# Patient Record
Sex: FEMALE | Race: WHITE | NOT HISPANIC OR LATINO | Employment: FULL TIME | URBAN - METROPOLITAN AREA
[De-identification: names, ages, dates, MRNs, and addresses within clinical notes are randomized per-mention and may not be internally consistent; named-entity substitution may affect disease eponyms.]

---

## 2017-01-02 DIAGNOSIS — O24.410 DIET CONTROLLED GESTATIONAL DIABETES MELLITUS (GDM), ANTEPARTUM: Primary | ICD-10-CM

## 2017-01-02 NOTE — TELEPHONE ENCOUNTER
Accu Check Test Strips AND Softclix Lancets      Last Written Prescription Date:  NA  Last Fill Quantity: NA,   # refills: NA  Last Office Visit with [facility], [facility] or [facility] prescribing provider: 5/20/16  Future Office visit:    Next 5 appointments (look out 90 days)     Jan 05, 2017  4:15 PM   ESTABLISHED PRENATAL with Bobby Styles MD   [facility] ([facility])    [address] [address] [phone]                   Routing refill request to provider for review/approval because:  Drug not active on patient's medication list

## 2017-01-03 ENCOUNTER — TELEPHONE (OUTPATIENT)
Dept: OBGYN | Facility: CLINIC | Age: 32
End: 2017-01-03

## 2017-01-05 ENCOUNTER — PRENATAL OFFICE VISIT (OUTPATIENT)
Dept: OBGYN | Facility: CLINIC | Age: 32
End: 2017-01-05
Payer: COMMERCIAL

## 2017-01-05 VITALS
BODY MASS INDEX: 26 KG/M2 | SYSTOLIC BLOOD PRESSURE: 104 MMHG | WEIGHT: 161 LBS | HEART RATE: 76 BPM | DIASTOLIC BLOOD PRESSURE: 62 MMHG | OXYGEN SATURATION: 99 %

## 2017-01-05 DIAGNOSIS — Z84.89 FAMILY HISTORY OF GENETIC DISORDER: Primary | ICD-10-CM

## 2017-01-05 DIAGNOSIS — O24.419 GESTATIONAL DIABETES MELLITUS (GDM), ANTEPARTUM, GESTATIONAL DIABETES METHOD OF CONTROL UNSPECIFIED: ICD-10-CM

## 2017-01-05 DIAGNOSIS — Z34.80 ENCOUNTER FOR SUPERVISION OF OTHER NORMAL PREGNANCY, UNSPECIFIED TRIMESTER: ICD-10-CM

## 2017-01-05 PROCEDURE — 87653 STREP B DNA AMP PROBE: CPT | Mod: 90 | Performed by: OBSTETRICS & GYNECOLOGY

## 2017-01-05 PROCEDURE — 99207 ZZC PRENATAL VISIT: CPT | Performed by: OBSTETRICS & GYNECOLOGY

## 2017-01-05 PROCEDURE — 99000 SPECIMEN HANDLING OFFICE-LAB: CPT | Performed by: OBSTETRICS & GYNECOLOGY

## 2017-01-05 RX ORDER — LANCETS 28 GAUGE
EACH MISCELLANEOUS
Qty: 1 BOX | Refills: 11 | Status: SHIPPED | OUTPATIENT
Start: 2017-01-05 | End: 2017-04-21

## 2017-01-05 NOTE — NURSING NOTE
"Chief Complaint   Patient presents with     Prenatal Care     36w1d  Group B due  Refill testing supplies for diabetes- has had a cold and BS have been running higher  Initial /62 mmHg  Pulse 76  Wt 161 lb (73.029 kg)  SpO2 99%  LMP 05/06/2016 Estimated body mass index is 26 kg/(m^2) as calculated from the following:    Height as of 5/31/16: 5' 6\" (1.676 m).    Weight as of this encounter: 161 lb (73.029 kg).  BP completed using cuff size: regular  Leslie Levi MA        "

## 2017-01-07 LAB
GP B STREP DNA SPEC QL NAA+PROBE: NORMAL
SPECIMEN SOURCE: NORMAL

## 2017-01-11 ENCOUNTER — ALLIED HEALTH/NURSE VISIT (OUTPATIENT)
Dept: EDUCATION SERVICES | Facility: CLINIC | Age: 32
End: 2017-01-11
Payer: COMMERCIAL

## 2017-01-11 DIAGNOSIS — O24.410 DIET CONTROLLED GESTATIONAL DIABETES MELLITUS (GDM), ANTEPARTUM: Primary | ICD-10-CM

## 2017-01-11 PROCEDURE — G0108 DIAB MANAGE TRN  PER INDIV: HCPCS

## 2017-01-11 NOTE — PROGRESS NOTES
Diabetes Self-Management Training - Gestational Diabetes    SUBJECTIVE/OBJECTIVE:  Sarahi Gates presents today for education related to gestational diabetes.  She is accompanied by self    Patient's gestational diabetes management related comments/concerns: none, feels she has a handle on GDM    Patient's emotional response to diabetes: expresses readiness to learn and confidence diabetes can be controlled    LMP 2016    Pre pregnancy weight: 148#    Weight gain 13 lbs at 37 weeks gestation.    Estimated Date of Delivery: 2017    GLC       94   2014    1 hour OGTT: 169 on 11/15/16  3 hour OGTT: Fastin; 1 hr: 225; 2 hr: 210, 3 hr: 71 on 16    Blood Glucose/Ketone Log:    Date Ketones Fasting Post Breakfast Post Lunch Post Supper   1/3 na 103           130     87   113     86 95 126 118     89 103 91 107     89 130 114 107     91 95 114 114   1/10  95  120      98 118           History   Smoking status     Never Smoker    Smokeless tobacco     Never Used       Lifestyle and Health Behaviors:  Physical Activity: no regular exercise program    Nutrition:  Patient eats 3 meals and 3 snacks per day.    Breakfast:  Whole wheat toast with PB and banana OR protein bar with fruit  Snack:  Protein bar OR greek yogurt with granola OR berries and nuts  Lunch:  Salad OR leftovers from dinner (white chicken chili soup OR pasta)  Snack:  Popcorn OR pistachios with berries  Dinner:  Fish with potatoes and green beans OR smoothie (not feeling well)  Bedtime Snack:  Popcorn OR handful of nuts    Other time(s) food is eaten? No, rarely may wake up in middle of the night     Beverages: water, coffee (black)    Cultural/Jewish diet restrictions: No     Biggest challenge to healthy eating is:  none    Pre-[place] Vitamin: Yes    Supplements: No   Experiencing nausea?  No , but heartburn    Socio/Economic considerations:  Support System: family and spouse/significant other    Health  Beliefs and Attitudes:   Stage of Change: ACTION (Actively working towards change)    ASSESSMENT:  Patient was diagnosed over a month ago, but read things online, and has a friend with diabetes as well that helped her start eating a little less carbohydrate. She tries to eat cleanly, and has been checking her blood sugar even before seeing diabetes education. She is likely going to be induced in a week, though was willing to come in today to learn a little more about post-partum care.     INTERVENTION:    Educational topics covered today:  GDM diagnosis, pathophysiology, Risks and Complications of GDM, When to Call a Diabetes Educator or OB Provider, Healthy Eating During Pregnancy, Counting Carbohydrates, Meal Planning for GDM, and Physical Activity, and what to do after delivery    Educational materials provided today:   [place] Understanding Gestational Diabetes  [place] [facility]  Care After Delivery    Pt verbalized understanding of concepts discussed and recommendations provided today.     PLAN:  Check glucose 4 times daily, before breakfast and 1 hour after each meal.     Check Ketones daily for one week, if negative, reduce testing to once a week.     Physical activity recommended: as able.    Meal plan: 2-3 carbs at breakfast, 3-4 carbs at lunch, 3-4 carbs at supper, 1-2 carbs at 3 snacks a day.  Follow consistent CHO meal plan, eat CHO and protein/fat at all meals/snacks.    Call/e-mail/MyChart message diabetes educator if 3 or more blood sugars are above the goal in 1 week or if ketones are positive.     Call/e-mail/MyChart message with questions/concerns.    FOLLOW-UP:  Call or e-mail educator if 3 or more blood sugars are above goal in 1 week.  Call or e-mail with questions or concerns.    ISAURO Solis CDE  Time Spent: 30 minutes  Encounter type: Individual

## 2017-01-12 ENCOUNTER — PRENATAL OFFICE VISIT (OUTPATIENT)
Dept: OBGYN | Facility: CLINIC | Age: 32
End: 2017-01-12
Payer: COMMERCIAL

## 2017-01-12 VITALS
DIASTOLIC BLOOD PRESSURE: 64 MMHG | SYSTOLIC BLOOD PRESSURE: 108 MMHG | HEART RATE: 69 BPM | WEIGHT: 157 LBS | BODY MASS INDEX: 25.35 KG/M2 | TEMPERATURE: 98 F | OXYGEN SATURATION: 99 %

## 2017-01-12 DIAGNOSIS — Z84.89 FAMILY HISTORY OF GENETIC DISORDER: Primary | ICD-10-CM

## 2017-01-12 DIAGNOSIS — Z34.83 PRENATAL CARE, SUBSEQUENT PREGNANCY, THIRD TRIMESTER: ICD-10-CM

## 2017-01-12 PROCEDURE — 99207 ZZC PRENATAL VISIT: CPT | Performed by: OBSTETRICS & GYNECOLOGY

## 2017-01-12 NOTE — MR AVS SNAPSHOT
After Visit Summary   1/12/2017    Sarahi Gates    MRN: [id number]           Patient Information     Date Of Birth          1985        Visit Information        Provider Department      1/12/2017 4:30 PM Bobby Styles MD [facility]        Today's Diagnoses     Family history of genetic disorder    -  1     Prenatal care, subsequent pregnancy, third trimester            Follow-ups after your visit        Who to contact     If you have questions or need follow up information about today's clinic visit or your schedule please contact [facility] directly at [phone].  Normal or non-critical lab and imaging results will be communicated to you by MyChart, letter or phone within 4 business days after the clinic has received the results. If you do not hear from us within 7 days, please contact the clinic through [employer] or phone. If you have a critical or abnormal lab result, we will notify you by phone as soon as possible.  Submit refill requests through [employer] or call your pharmacy and they will forward the refill request to us. Please allow 3 business days for your refill to be completed.          Additional Information About Your Visit        MyChart Information     [employer] gives you secure access to your electronic health record. If you see a primary care provider, you can also send messages to your care team and make appointments. If you have questions, please call your primary care clinic.  If you do not have a primary care provider, please call [phone] and they will assist you.        Care EveryWhere ID     This is your Care EveryWhere ID. This could be used by other organizations to access your [facility] medical records  [id number]        Your Vitals Were     Pulse Temperature Pulse Oximetry Last Period          69 98  F (36.7  C) (Oral) 99% 05/06/2016         Blood Pressure from Last 3 Encounters:   01/12/17 108/64   01/05/17  104/62   12/15/16 98/58    Weight from Last 3 Encounters:   01/12/17 157 lb (71.215 kg)   01/05/17 161 lb (73.029 kg)   12/15/16 155 lb 12.8 oz (70.67 kg)              Today, you had the following     No orders found for display       Primary Care Provider Office Phone # Fax #    Lydia Iliana Gomez [phone]       [facility] [address]  [facility] [address]        Thank you!     Thank you for choosing [facility]  for your care. Our goal is always to provide you with excellent care. Hearing back from our patients is one way we can continue to improve our services. Please take a few minutes to complete the written survey that you may receive in the mail after your visit with us. Thank you!             Your Updated Medication List - Protect others around you: Learn how to safely use, store and throw away your medicines at www.disposemymeds.org.          This list is accurate as of: 1/12/17  4:40 PM.  Always use your most recent med list.                   Brand Name Dispense Instructions for use    albuterol 108 (90 BASE) MCG/ACT Inhaler    albuterol    3 Inhaler    Inhale 1-2 puffs into the lungs every 4 hours as needed for shortness of breath / dyspnea       blood glucose monitoring lancets     1 Box    Use to test blood sugars 4 times daily or as directed.       blood glucose monitoring meter device kit    no brand specified    1 kit    Use to test blood sugar 4 times daily or as directed.       * blood glucose monitoring test strip    FREESTYLE LITE    100 strip    Use to test blood sugars 4 times daily or as directed.       * blood glucose monitoring test strip    ACCU-CHEK ISH    100 strip    Use to test blood sugars 4 times daily or as directed.       cetirizine 10 MG tablet    zyrTEC    30 tablet    Take 1 tablet by mouth every evening.       fluticasone 50 MCG/ACT spray    FLONASE     Spray 1 spray into both nostrils daily as needed        omeprazole 40 MG capsule    priLOSEC    90 capsule    Take 1 capsule (40 mg) by mouth daily Take 30-60 minutes before a meal.       PRENATAL VITAMIN PO          sertraline 50 MG tablet    ZOLOFT    30 tablet    Take 1 tablet (50 mg) by mouth daily       * Notice:  This list has 2 medication(s) that are the same as other medications prescribed for you. Read the directions carefully, and ask your doctor or other care provider to review them with you.

## 2017-01-12 NOTE — NURSING NOTE
"Chief Complaint   Patient presents with     Prenatal Care       Initial /64 mmHg  Pulse 69  Temp(Src) 98  F (36.7  C) (Oral)  Wt 157 lb (71.215 kg)  SpO2 99%  LMP 2016 Estimated body mass index is 25.35 kg/(m^2) as calculated from the following:    Height as of 16: 5' 6\" (1.676 m).    Weight as of this encounter: 157 lb (71.215 kg).  BP completed using cuff size: regular        The following HM Due: NONE      The following patient reported/Care Every where data was sent to:  P ABSTRACT QUALITY INITIATIVES [76601]       Having lower back pain and some cramping    States feeling good, No concerns.  Having good fetal movements.   37w1d    Rosibel Soto, [facility]                  "

## 2017-01-16 ENCOUNTER — RADIANT APPOINTMENT (OUTPATIENT)
Dept: ULTRASOUND IMAGING | Facility: CLINIC | Age: 32
End: 2017-01-16
Attending: OBSTETRICS & GYNECOLOGY
Payer: COMMERCIAL

## 2017-01-16 DIAGNOSIS — Z34.83 PRENATAL CARE, SUBSEQUENT PREGNANCY, THIRD TRIMESTER: ICD-10-CM

## 2017-01-16 PROCEDURE — 76816 OB US FOLLOW-UP PER FETUS: CPT | Performed by: OBSTETRICS & GYNECOLOGY

## 2017-01-18 ENCOUNTER — PRENATAL OFFICE VISIT (OUTPATIENT)
Dept: OBGYN | Facility: CLINIC | Age: 32
End: 2017-01-18
Payer: COMMERCIAL

## 2017-01-18 VITALS — WEIGHT: 160 LBS | SYSTOLIC BLOOD PRESSURE: 112 MMHG | BODY MASS INDEX: 25.84 KG/M2 | DIASTOLIC BLOOD PRESSURE: 68 MMHG

## 2017-01-18 DIAGNOSIS — Z84.89 FAMILY HISTORY OF GENETIC DISORDER: Primary | ICD-10-CM

## 2017-01-18 DIAGNOSIS — Z34.83 PRENATAL CARE, SUBSEQUENT PREGNANCY, THIRD TRIMESTER: ICD-10-CM

## 2017-01-18 PROCEDURE — 99207 ZZC PRENATAL VISIT: CPT | Performed by: OBSTETRICS & GYNECOLOGY

## 2017-01-18 NOTE — NURSING NOTE
"Chief Complaint   Patient presents with     Prenatal Care   38w0d  Needs refill on     Initial /68 mmHg  Wt 160 lb (72.576 kg)  LMP 05/06/2016 Estimated body mass index is 25.84 kg/(m^2) as calculated from the following:    Height as of 5/31/16: 5' 6\" (1.676 m).    Weight as of this encounter: 160 lb (72.576 kg).  BP completed using cuff size: regular    "

## 2017-01-20 ENCOUNTER — HOSPITAL ENCOUNTER (OUTPATIENT)
Facility: CLINIC | Age: 32
Discharge: HOME | End: 2017-01-20
Attending: OBSTETRICS & GYNECOLOGY | Admitting: OBSTETRICS & GYNECOLOGY
Payer: COMMERCIAL

## 2017-01-20 ENCOUNTER — TELEPHONE (OUTPATIENT)
Dept: NURSING | Facility: CLINIC | Age: 32
End: 2017-01-20

## 2017-01-20 VITALS
SYSTOLIC BLOOD PRESSURE: 109 MMHG | BODY MASS INDEX: 25.71 KG/M2 | RESPIRATION RATE: 18 BRPM | TEMPERATURE: 98 F | WEIGHT: 160 LBS | DIASTOLIC BLOOD PRESSURE: 78 MMHG | HEIGHT: 66 IN

## 2017-01-20 PROBLEM — Z36.89 ENCOUNTER FOR TRIAGE IN PREGNANT PATIENT: Status: ACTIVE | Noted: 2017-01-20

## 2017-01-20 LAB
A1 MICROGLOB PLACENTAL VAG QL: NEGATIVE
GLUCOSE BLDC GLUCOMTR-MCNC: 75 MG/DL (ref 70–99)

## 2017-01-20 PROCEDURE — 84112 EVAL AMNIOTIC FLUID PROTEIN: CPT | Performed by: OBSTETRICS & GYNECOLOGY

## 2017-01-20 PROCEDURE — 99214 OFFICE O/P EST MOD 30 MIN: CPT

## 2017-01-20 PROCEDURE — 82962 GLUCOSE BLOOD TEST: CPT

## 2017-01-20 NOTE — IP AVS SNAPSHOT
[facility] Labor and Delivery    [address]    [facility] [address]    Phone:  [phone]    Fax:  [phone]                                       After Visit Summary   1/20/2017    Sarahi Gates    MRN: [id number]           After Visit Summary Signature Page     I have received my discharge instructions, and my questions have been answered. I have discussed any challenges I see with this plan with the nurse or doctor.    ..........................................................................................................................................  Patient/Patient Representative Signature      ..........................................................................................................................................  Patient Representative Print Name and Relationship to Patient    ..................................................               ................................................  Date                                            Time    ..........................................................................................................................................  Reviewed by Signature/Title    ...................................................              ..............................................  Date                                                            Time

## 2017-01-20 NOTE — TELEPHONE ENCOUNTER
"Call Type: Triage Call    Presenting Problem: \"I may have a leak.\" Patient reporting clear  vaginal discharge starting 1/19/17. Irregular contractions. Fetal  movement +. ARY 2/1/17. Paged Dr Taylor through [facility] [place] at 644 a.m. to call patient at .  Triage Note:  Guideline Title: Pregnancy: Ruptured Membranes  Recommended Disposition: Call Provider Immediately  Original Inclination: Did not know what to do  Override Disposition:  Intended Action: Follow advice given  Physician Contacted: Yes  Sudden gush or trickle of fluid from the vagina ?  YES  New or worsening signs and symptoms that may indicate shock ? NO  More than 37 weeks gestation AND contractions ? NO  Umbilical cord or any part of the baby (head, bottom, arm or leg) at the opening  of the vagina ? NO  Gush or leakage of green or green-tinged or port-wine colored fluid (reddish and  watery) from the vagina ? NO  Decreased fetal movement (less than 10 kicks/movements within two hours or a  significant change in usual pattern compared to previous days) ? NO  Heavy vaginal bleeding (soaking 1 pad every hour for 2 hours or more) ? NO  Continuous bright red vaginal bleeding for more than 15 minutes (more than  spotting) ? NO  37 weeks gestation or less AND contractions ? NO  Physician Instructions:  Care Advice: Do not eat or drink anything until discussed with provider.  Call  if any of these occur: profuse bright red vaginal bleeding  continuous (without relaxation) abdominal pain  the umbilical cord or any fetal part in vagina  bag of james coming through vagina  feeling of wanting to push or have a bowel movement.  "

## 2017-01-20 NOTE — IP AVS SNAPSHOT
MRN:[id number]                      After Visit Summary   1/20/2017    Sarahi Gates    MRN: [id number]           Thank you!     Thank you for choosing [facility] for your care. Our goal is always to provide you with excellent care. Hearing back from our patients is one way we can continue to improve our services. Please take a few minutes to complete the written survey that you may receive in the mail after you visit. If you would like to speak to someone directly about your visit please contact Patient Relations at [phone]. Thank you!          Patient Information     Date Of Birth          1985        About your hospital stay     You were admitted on:  January 20, 2017 You last received care in the:  [facility] Labor and Delivery    You were discharged on:  January 20, 2017       Who to Call     For medical emergencies, please call 911.  For non-urgent questions about your medical care, please call your primary care provider or clinic, [phone]          Attending Provider     Provider    Jackson Taylor MD       Primary Care Provider Office Phone # Fax #    Bobby Styles [phone]       [facility] [address]  [facility] [address]        Your next 10 appointments already scheduled     Jan 25, 2017  1:45 PM   ESTABLISHED PRENATAL with Bobby Styles MD   [facility] ([facility]    [address]  [facility] [id number] [phone]              Further instructions from your care team       Discharge Instruction for Undelivered Patients      You were seen for: Membrane Assessment  We Consulted: DR TAYLOR  You had (Test or Medicine): AMNISURE NEGATIVE & FETAL MONITORING WITHIN NORMAL LIMITS    Diet:   To manager your diabetes, follow the guidelines for eating and drinking given to you by your Clinic Provider or Diabetes Educator.       Activity:  Call your  "doctor or nurse midwife if your baby is moving less than usual.     Call your provider if you notice:  Swelling in your face or increased swelling in your hands or legs.  Headaches that are not relieved by Tylenol (acetaminophen).  Changes in your vision (blurring: seeing spots or stars.)  Nausea (sick to your stomach) and vomiting (throwing up).   Weight gain of 5 pounds or more per week.  Heartburn that doesn't go away.  Signs of bladder infection: pain when you urinate (use the toilet), need to go more often and more urgently.  The bag of james (rupture of membranes) breaks, or you notice leaking in your underwear.  Bright red blood in your underwear.  Abdominal (lower belly) or stomach pain.  For first baby: Contractions (tightening) less than 5 minutes apart for one hour or more.  Second (plus) baby: Contractions (tightening) less than 10 minutes apart and getting stronger.  *If less than 34 weeks: Contractions (tightenings) more than 6 times in one hour.  Increase or change in vaginal discharge (note the color and amount)  Other:     Follow-up:  As scheduled in the clinic          Pending Results     No orders found from 1/19/2017 to 1/21/2017.            Admission Information        Provider Department Dept Phone    1/20/2017 Jackson Taylor MD  Labor And Delivery [phone]      Your Vitals Were     Temperature Height Weight BMI (Body Mass Index) Last Period       98  F (36.7  C) (Oral) 1.676 m (5' 6\") 72.576 kg (160 lb) 25.84 kg/m2 05/06/2016       [employer] Information     [employer] gives you secure access to your electronic health record. If you see a primary care provider, you can also send messages to your care team and make appointments. If you have questions, please call your primary care clinic.  If you do not have a primary care provider, please call [phone] and they will assist you.        Care EveryWhere ID     This is your Care EveryWhere ID. This could be used by other organizations to " access your [facility] medical records  [id number]           Review of your medicines      UNREVIEWED medicines. Ask your doctor about these medicines        Dose / Directions    albuterol 108 (90 BASE) MCG/ACT Inhaler   Commonly known as:  albuterol   Used for:  Moderate persistent asthma        Dose:  1-2 puff   Inhale 1-2 puffs into the lungs every 4 hours as needed for shortness of breath / dyspnea   Quantity:  3 Inhaler   Refills:  3       cetirizine 10 MG tablet   Commonly known as:  zyrTEC   Used for:  Supervision of normal first pregnancy        Dose:  10 mg   Take 1 tablet by mouth every evening.   Quantity:  30 tablet   Refills:  1       fluticasone 50 MCG/ACT spray   Commonly known as:  FLONASE        Dose:  1 spray   Spray 1 spray into both nostrils daily as needed   Refills:  0       omeprazole 40 MG capsule   Commonly known as:  priLOSEC   Used for:  Gastroesophageal reflux disease without esophagitis        Dose:  40 mg   Take 1 capsule (40 mg) by mouth daily Take 30-60 minutes before a meal.   Quantity:  90 capsule   Refills:  3       PRENATAL VITAMIN PO        Refills:  0       sertraline 50 MG tablet   Commonly known as:  ZOLOFT   Used for:  Anxiety        Dose:  50 mg   Take 1 tablet (50 mg) by mouth daily   Quantity:  30 tablet   Refills:  3         CONTINUE these medicines which have NOT CHANGED        Dose / Directions    blood glucose monitoring lancets   Used for:  Gestational diabetes mellitus (GDM), antepartum, gestational diabetes method of control unspecified        Use to test blood sugars 4 times daily or as directed.   Quantity:  1 Box   Refills:  11       blood glucose monitoring meter device kit   Commonly known as:  no brand specified   Used for:  Diet controlled gestational diabetes mellitus (GDM), antepartum        Use to test blood sugar 4 times daily or as directed.   Quantity:  1 kit   Refills:  0       * blood glucose monitoring test strip   Commonly known as:  FREESTYLE LITE    Used for:  Gestational diabetes mellitus (GDM), antepartum, gestational diabetes method of control unspecified        Use to test blood sugars 4 times daily or as directed.   Quantity:  100 strip   Refills:  11       * blood glucose monitoring test strip   Commonly known as:  ACCU-CHEK ISH   Used for:  Diet controlled gestational diabetes mellitus (GDM), antepartum        Use to test blood sugars 4 times daily or as directed.   Quantity:  100 strip   Refills:  11       * Notice:  This list has 2 medication(s) that are the same as other medications prescribed for you. Read the directions carefully, and ask your doctor or other care provider to review them with you.             Protect others around you: Learn how to safely use, store and throw away your medicines at www.[employer]eds.org.             Medication List: This is a list of all your medications and when to take them. Check marks below indicate your daily home schedule. Keep this list as a reference.      Medications           Morning Afternoon Evening Bedtime As Needed    albuterol 108 (90 BASE) MCG/ACT Inhaler   Commonly known as:  albuterol   Inhale 1-2 puffs into the lungs every 4 hours as needed for shortness of breath / dyspnea                                blood glucose monitoring lancets   Use to test blood sugars 4 times daily or as directed.                                blood glucose monitoring meter device kit   Commonly known as:  no brand specified   Use to test blood sugar 4 times daily or as directed.                                * blood glucose monitoring test strip   Commonly known as:  FREESTYLE LITE   Use to test blood sugars 4 times daily or as directed.                                * blood glucose monitoring test strip   Commonly known as:  ACCU-CHEK ISH   Use to test blood sugars 4 times daily or as directed.                                cetirizine 10 MG tablet   Commonly known as:  zyrTEC   Take 1 tablet by mouth every  evening.                                fluticasone 50 MCG/ACT spray   Commonly known as:  FLONASE   Spray 1 spray into both nostrils daily as needed                                omeprazole 40 MG capsule   Commonly known as:  priLOSEC   Take 1 capsule (40 mg) by mouth daily Take 30-60 minutes before a meal.                                PRENATAL VITAMIN PO                                sertraline 50 MG tablet   Commonly known as:  ZOLOFT   Take 1 tablet (50 mg) by mouth daily                                * Notice:  This list has 2 medication(s) that are the same as other medications prescribed for you. Read the directions carefully, and ask your doctor or other care provider to review them with you.

## 2017-01-20 NOTE — PLAN OF CARE
Problem: Goal Outcome Summary  Goal: Goal Outcome Summary  Data: [facility] 7[facility].   Reason for maternal/fetal assessment per patient is Rule out rupture of membranes  . Patient is a . Prenatal record reviewed.      Obstetric History       T1      TAB0   SAB0   E0   M0   L1        # Outcome Date GA Lbr Jose Antonio/2nd Weight Sex Delivery Anes PTL Lv   2 Current                     1 Term 13 38w4d 07:50 / 02:23 3.54 kg (7 lb 12.9 oz) M Vag-Spont EPI N        Name: Tyler      Apgar1:  9                Apgar5: 9          Medical History:   Past Medical History   Diagnosis Date     Environmental allergies         using zyrtec     Asthma         excercise induced     Abnormal Pap smear         most recent normal     Mental disorder         ANXIETY     Diabetes (H)         GESTATIONAL/DIET   . Gestational Age 38w2d. VSS. Cervix: not examined.  Fetal movement present. Patient denies cramping, backache, pelvic pressure, UTI symptoms, GI problems, bloody show, vaginal bleeding, edema, headache, visual disturbances, epigastric or URQ pain, abdominal pain.  Patient stated that she thought she started leaking clear fluid yesterday about 1530 and since she had prolonged rupture of membranes with her first she thought she should check the leaking out.  She continues to feel damp when the baby moves.  She states that her diet controlled gestational diabetes has been well controled.   Support persons was present.  Action: Verbal consent for EFM. Triage assessment completed. EFM applied for fetal survellance. Uterine assessment toco and palpation. Fetal assessment: Presumed adequate fetal oxygenation documented (see flow record).  Amnisure was obtained-negative results.  Patient instructed to report change in fetal movement, vaginal leaking of fluid or bleeding, abdominal pain, or any concerns related to the pregnancy to her nurse/physician.     Response: Dr. Taylor informed of test  results and fetal monitoring. Plan per provider is discharge to home and follow up in clinic as scheduled. Patient verbalized understanding of education and verbalized agreement with plan. Discharged ambulatory at 915.

## 2017-01-25 ENCOUNTER — PRENATAL OFFICE VISIT (OUTPATIENT)
Dept: OBGYN | Facility: CLINIC | Age: 32
End: 2017-01-25
Payer: COMMERCIAL

## 2017-01-25 VITALS — WEIGHT: 162.3 LBS | DIASTOLIC BLOOD PRESSURE: 80 MMHG | SYSTOLIC BLOOD PRESSURE: 120 MMHG | BODY MASS INDEX: 26.21 KG/M2

## 2017-01-25 DIAGNOSIS — Z84.89 FAMILY HISTORY OF GENETIC DISORDER: Primary | ICD-10-CM

## 2017-01-25 DIAGNOSIS — Z34.83 PRENATAL CARE, SUBSEQUENT PREGNANCY, THIRD TRIMESTER: ICD-10-CM

## 2017-01-25 DIAGNOSIS — O92.79 LACTATION DISORDER, ANTEPARTUM: ICD-10-CM

## 2017-01-25 PROCEDURE — 99207 ZZC PRENATAL VISIT: CPT | Performed by: OBSTETRICS & GYNECOLOGY

## 2017-01-25 RX ORDER — BREAST PUMP
EACH MISCELLANEOUS
Qty: 1 EACH | Refills: 0 | Status: SHIPPED | OUTPATIENT
Start: 2017-01-25 | End: 2017-04-21

## 2017-01-25 NOTE — MR AVS SNAPSHOT
After Visit Summary   1/25/2017    Sarahi Gates    MRN: [id number]           Patient Information     Date Of Birth          1985        Visit Information        Provider Department      1/25/2017 1:45 PM Bobby Styles MD [facility]        Today's Diagnoses     Family history of genetic disorder    -  1        Follow-ups after your visit        Who to contact     If you have questions or need follow up information about today's clinic visit or your schedule please contact [facility] directly at [phone].  Normal or non-critical lab and imaging results will be communicated to you by MyChart, letter or phone within 4 business days after the clinic has received the results. If you do not hear from us within 7 days, please contact the clinic through [facility] or phone. If you have a critical or abnormal lab result, we will notify you by phone as soon as possible.  Submit refill requests through [facility] or call your pharmacy and they will forward the refill request to us. Please allow 3 business days for your refill to be completed.          Additional Information About Your Visit        MyChart Information     [employer] gives you secure access to your electronic health record. If you see a primary care provider, you can also send messages to your care team and make appointments. If you have questions, please call your primary care clinic.  If you do not have a primary care provider, please call [phone] and they will assist you.        Care EveryWhere ID     This is your Care EveryWhere ID. This could be used by other organizations to access your [place] medical records  [id number]        Your Vitals Were     Last Period                   05/06/2016            Blood Pressure from Last 3 Encounters:   01/25/17 120/80   01/20/17 109/78   01/18/17 112/68    Weight from Last 3 Encounters:   01/25/17 162 lb 4.8 oz (73.619 kg)   01/20/17 160 lb (72.576 kg)    01/18/17 160 lb (72.576 kg)              Today, you had the following     No orders found for display       Primary Care Provider Office Phone # Fax #    Bobby Styles [phone]       [facility] [address] [facility] [address]        Thank you!     Thank you for choosing [facility]  for your care. Our goal is always to provide you with excellent care. Hearing back from our patients is one way we can continue to improve our services. Please take a few minutes to complete the written survey that you may receive in the mail after your visit with us. Thank you!             Your Updated Medication List - Protect others around you: Learn how to safely use, store and throw away your medicines at www.disposemymeds.org.          This list is accurate as of: 1/25/17  2:07 PM.  Always use your most recent med list.                   Brand Name Dispense Instructions for use    albuterol 108 (90 BASE) MCG/ACT Inhaler    albuterol    3 Inhaler    Inhale 1-2 puffs into the lungs every 4 hours as needed for shortness of breath / dyspnea       blood glucose monitoring lancets     1 Box    Use to test blood sugars 4 times daily or as directed.       blood glucose monitoring meter device kit    no brand specified    1 kit    Use to test blood sugar 4 times daily or as directed.       * blood glucose monitoring test strip    FREESTYLE LITE    100 strip    Use to test blood sugars 4 times daily or as directed.       * blood glucose monitoring test strip    ACCU-CHEK ISH    100 strip    Use to test blood sugars 4 times daily or as directed.       cetirizine 10 MG tablet    zyrTEC    30 tablet    Take 1 tablet by mouth every evening.       fluticasone 50 MCG/ACT spray    FLONASE     Spray 1 spray into both nostrils daily as needed       omeprazole 40 MG capsule    priLOSEC    90 capsule    Take 1 capsule (40 mg) by mouth daily Take 30-60 minutes before a meal.        PRENATAL VITAMIN PO          sertraline 50 MG tablet    ZOLOFT    30 tablet    Take 1 tablet (50 mg) by mouth daily       * Notice:  This list has 2 medication(s) that are the same as other medications prescribed for you. Read the directions carefully, and ask your doctor or other care provider to review them with you.

## 2017-01-25 NOTE — NURSING NOTE
Pt scheduled for cervical ripening 2/1/2017 at 7:30pm at [facility] and induction 2/2/2017. Pt advised to call 1 hour prior to procedure.   Placed on calendar.  Yajaira Freire MA

## 2017-01-25 NOTE — NURSING NOTE
"Chief Complaint   Patient presents with     Prenatal Care   39w0d      Initial /80 mmHg  Wt 162 lb 4.8 oz (73.619 kg)  LMP 05/06/2016 Estimated body mass index is 26.21 kg/(m^2) as calculated from the following:    Height as of 1/20/17: 5' 6\" (1.676 m).    Weight as of this encounter: 162 lb 4.8 oz (73.619 kg).  BP completed using cuff size: regular    "

## 2017-01-29 ENCOUNTER — ANESTHESIA EVENT (OUTPATIENT)
Dept: OBGYN | Facility: CLINIC | Age: 32
End: 2017-01-29
Payer: COMMERCIAL

## 2017-01-29 ENCOUNTER — ANESTHESIA (OUTPATIENT)
Dept: OBGYN | Facility: CLINIC | Age: 32
End: 2017-01-29
Payer: COMMERCIAL

## 2017-01-29 ENCOUNTER — HOSPITAL ENCOUNTER (INPATIENT)
Facility: CLINIC | Age: 32
LOS: 2 days | Discharge: HOME | End: 2017-01-31
Attending: OBSTETRICS & GYNECOLOGY | Admitting: OBSTETRICS & GYNECOLOGY
Payer: COMMERCIAL

## 2017-01-29 LAB
ABO + RH BLD: NORMAL
ABO + RH BLD: NORMAL
GLUCOSE BLDC GLUCOMTR-MCNC: 86 MG/DL (ref 70–99)
SPECIMEN EXP DATE BLD: NORMAL

## 2017-01-29 PROCEDURE — 40000671 ZZH STATISTIC ANESTHESIA CASE

## 2017-01-29 PROCEDURE — 00000146 ZZHCL STATISTIC GLUCOSE BY METER IP

## 2017-01-29 PROCEDURE — 25800025 ZZH RX 258: Performed by: OBSTETRICS & GYNECOLOGY

## 2017-01-29 PROCEDURE — 86901 BLOOD TYPING SEROLOGIC RH(D): CPT | Performed by: OBSTETRICS & GYNECOLOGY

## 2017-01-29 PROCEDURE — 72200001 ZZH LABOR CARE VAGINAL DELIVERY SINGLE

## 2017-01-29 PROCEDURE — 86900 BLOOD TYPING SEROLOGIC ABO: CPT | Performed by: OBSTETRICS & GYNECOLOGY

## 2017-01-29 PROCEDURE — 86780 TREPONEMA PALLIDUM: CPT | Performed by: OBSTETRICS & GYNECOLOGY

## 2017-01-29 PROCEDURE — 25000128 H RX IP 250 OP 636

## 2017-01-29 PROCEDURE — 25000125 ZZHC RX 250: Performed by: ANESTHESIOLOGY

## 2017-01-29 PROCEDURE — 59400 OBSTETRICAL CARE: CPT | Performed by: OBSTETRICS & GYNECOLOGY

## 2017-01-29 PROCEDURE — 10907ZC DRAINAGE OF AMNIOTIC FLUID, THERAPEUTIC FROM PRODUCTS OF CONCEPTION, VIA NATURAL OR ARTIFICIAL OPENING: ICD-10-PCS | Performed by: OBSTETRICS & GYNECOLOGY

## 2017-01-29 PROCEDURE — 25000125 ZZHC RX 250: Performed by: OBSTETRICS & GYNECOLOGY

## 2017-01-29 PROCEDURE — 12000029 ZZH R&B OB INTERMEDIATE

## 2017-01-29 PROCEDURE — 25000132 ZZH RX MED GY IP 250 OP 250 PS 637: Performed by: OBSTETRICS & GYNECOLOGY

## 2017-01-29 PROCEDURE — 37000011 ZZH ANESTHESIA WARD SERVICE

## 2017-01-29 RX ORDER — PRENATAL VIT/IRON FUM/FOLIC AC 27MG-0.8MG
1 TABLET ORAL DAILY
Status: DISCONTINUED | OUTPATIENT
Start: 2017-01-29 | End: 2017-01-31 | Stop reason: HOSPADM

## 2017-01-29 RX ORDER — MISOPROSTOL 200 UG/1
400 TABLET ORAL
Status: DISCONTINUED | OUTPATIENT
Start: 2017-01-29 | End: 2017-01-31 | Stop reason: HOSPADM

## 2017-01-29 RX ORDER — OXYTOCIN/0.9 % SODIUM CHLORIDE 30/500 ML
340 PLASTIC BAG, INJECTION (ML) INTRAVENOUS CONTINUOUS PRN
Status: DISCONTINUED | OUTPATIENT
Start: 2017-01-29 | End: 2017-01-31 | Stop reason: HOSPADM

## 2017-01-29 RX ORDER — OXYTOCIN/0.9 % SODIUM CHLORIDE 30/500 ML
100-340 PLASTIC BAG, INJECTION (ML) INTRAVENOUS CONTINUOUS PRN
Status: COMPLETED | OUTPATIENT
Start: 2017-01-29 | End: 2017-01-29

## 2017-01-29 RX ORDER — NALOXONE HYDROCHLORIDE 0.4 MG/ML
.1-.4 INJECTION, SOLUTION INTRAMUSCULAR; INTRAVENOUS; SUBCUTANEOUS
Status: DISCONTINUED | OUTPATIENT
Start: 2017-01-29 | End: 2017-01-29

## 2017-01-29 RX ORDER — SODIUM CHLORIDE, SODIUM LACTATE, POTASSIUM CHLORIDE, CALCIUM CHLORIDE 600; 310; 30; 20 MG/100ML; MG/100ML; MG/100ML; MG/100ML
INJECTION, SOLUTION INTRAVENOUS CONTINUOUS
Status: DISCONTINUED | OUTPATIENT
Start: 2017-01-29 | End: 2017-01-29

## 2017-01-29 RX ORDER — NALBUPHINE HYDROCHLORIDE 10 MG/ML
2.5-5 INJECTION, SOLUTION INTRAMUSCULAR; INTRAVENOUS; SUBCUTANEOUS EVERY 6 HOURS PRN
Status: DISCONTINUED | OUTPATIENT
Start: 2017-01-29 | End: 2017-01-29

## 2017-01-29 RX ORDER — NALOXONE HYDROCHLORIDE 0.4 MG/ML
.1-.4 INJECTION, SOLUTION INTRAMUSCULAR; INTRAVENOUS; SUBCUTANEOUS
Status: DISCONTINUED | OUTPATIENT
Start: 2017-01-29 | End: 2017-01-31 | Stop reason: HOSPADM

## 2017-01-29 RX ORDER — ACETAMINOPHEN 325 MG/1
650 TABLET ORAL EVERY 4 HOURS PRN
Status: DISCONTINUED | OUTPATIENT
Start: 2017-01-29 | End: 2017-01-31 | Stop reason: HOSPADM

## 2017-01-29 RX ORDER — IBUPROFEN 400 MG/1
400-800 TABLET, FILM COATED ORAL EVERY 6 HOURS PRN
Status: DISCONTINUED | OUTPATIENT
Start: 2017-01-29 | End: 2017-01-31 | Stop reason: HOSPADM

## 2017-01-29 RX ORDER — OXYCODONE HYDROCHLORIDE 5 MG/1
5-10 TABLET ORAL
Status: DISCONTINUED | OUTPATIENT
Start: 2017-01-29 | End: 2017-01-31 | Stop reason: HOSPADM

## 2017-01-29 RX ORDER — OXYTOCIN/0.9 % SODIUM CHLORIDE 30/500 ML
100 PLASTIC BAG, INJECTION (ML) INTRAVENOUS CONTINUOUS
Status: DISCONTINUED | OUTPATIENT
Start: 2017-01-29 | End: 2017-01-31 | Stop reason: HOSPADM

## 2017-01-29 RX ORDER — DEXTROSE, SODIUM CHLORIDE, SODIUM LACTATE, POTASSIUM CHLORIDE, AND CALCIUM CHLORIDE 5; .6; .31; .03; .02 G/100ML; G/100ML; G/100ML; G/100ML; G/100ML
INJECTION, SOLUTION INTRAVENOUS CONTINUOUS
Status: DISCONTINUED | OUTPATIENT
Start: 2017-01-29 | End: 2017-01-29

## 2017-01-29 RX ORDER — HYDROCORTISONE 2.5 %
CREAM (GRAM) TOPICAL 3 TIMES DAILY PRN
Status: DISCONTINUED | OUTPATIENT
Start: 2017-01-29 | End: 2017-01-31 | Stop reason: HOSPADM

## 2017-01-29 RX ORDER — ALBUTEROL SULFATE 90 UG/1
1-2 AEROSOL, METERED RESPIRATORY (INHALATION) EVERY 4 HOURS PRN
Status: DISCONTINUED | OUTPATIENT
Start: 2017-01-29 | End: 2017-01-31 | Stop reason: HOSPADM

## 2017-01-29 RX ORDER — OXYTOCIN 10 [USP'U]/ML
10 INJECTION, SOLUTION INTRAMUSCULAR; INTRAVENOUS
Status: DISCONTINUED | OUTPATIENT
Start: 2017-01-29 | End: 2017-01-29

## 2017-01-29 RX ORDER — CARBOPROST TROMETHAMINE 250 UG/ML
250 INJECTION, SOLUTION INTRAMUSCULAR
Status: DISCONTINUED | OUTPATIENT
Start: 2017-01-29 | End: 2017-01-29

## 2017-01-29 RX ORDER — METHYLERGONOVINE MALEATE 0.2 MG/ML
200 INJECTION INTRAVENOUS
Status: COMPLETED | OUTPATIENT
Start: 2017-01-29 | End: 2017-01-29

## 2017-01-29 RX ORDER — OXYCODONE AND ACETAMINOPHEN 5; 325 MG/1; MG/1
1 TABLET ORAL
Status: DISCONTINUED | OUTPATIENT
Start: 2017-01-29 | End: 2017-01-29

## 2017-01-29 RX ORDER — LANOLIN 100 %
OINTMENT (GRAM) TOPICAL
Status: DISCONTINUED | OUTPATIENT
Start: 2017-01-29 | End: 2017-01-31 | Stop reason: HOSPADM

## 2017-01-29 RX ORDER — HYDROMORPHONE HYDROCHLORIDE 1 MG/ML
0.5 INJECTION, SOLUTION INTRAMUSCULAR; INTRAVENOUS; SUBCUTANEOUS ONCE
Status: COMPLETED | OUTPATIENT
Start: 2017-01-29 | End: 2017-01-29

## 2017-01-29 RX ORDER — OXYTOCIN 10 [USP'U]/ML
10 INJECTION, SOLUTION INTRAMUSCULAR; INTRAVENOUS
Status: DISCONTINUED | OUTPATIENT
Start: 2017-01-29 | End: 2017-01-31 | Stop reason: HOSPADM

## 2017-01-29 RX ORDER — ONDANSETRON 2 MG/ML
4 INJECTION INTRAMUSCULAR; INTRAVENOUS EVERY 6 HOURS PRN
Status: DISCONTINUED | OUTPATIENT
Start: 2017-01-29 | End: 2017-01-29

## 2017-01-29 RX ORDER — DEXTROSE MONOHYDRATE 25 G/50ML
25-50 INJECTION, SOLUTION INTRAVENOUS
Status: DISCONTINUED | OUTPATIENT
Start: 2017-01-29 | End: 2017-01-29

## 2017-01-29 RX ORDER — SODIUM CHLORIDE 9 MG/ML
INJECTION, SOLUTION INTRAVENOUS CONTINUOUS
Status: DISCONTINUED | OUTPATIENT
Start: 2017-01-29 | End: 2017-01-29

## 2017-01-29 RX ORDER — ONDANSETRON 4 MG/1
4 TABLET, ORALLY DISINTEGRATING ORAL EVERY 6 HOURS PRN
Status: DISCONTINUED | OUTPATIENT
Start: 2017-01-29 | End: 2017-01-29

## 2017-01-29 RX ORDER — CETIRIZINE HYDROCHLORIDE 10 MG/1
10 TABLET ORAL EVERY EVENING
Status: DISCONTINUED | OUTPATIENT
Start: 2017-01-30 | End: 2017-01-31 | Stop reason: HOSPADM

## 2017-01-29 RX ORDER — NICOTINE POLACRILEX 4 MG
15-30 LOZENGE BUCCAL
Status: DISCONTINUED | OUTPATIENT
Start: 2017-01-29 | End: 2017-01-31 | Stop reason: HOSPADM

## 2017-01-29 RX ORDER — ACETAMINOPHEN 325 MG/1
650 TABLET ORAL EVERY 4 HOURS PRN
Status: DISCONTINUED | OUTPATIENT
Start: 2017-01-29 | End: 2017-01-29

## 2017-01-29 RX ORDER — DEXTROSE MONOHYDRATE 25 G/50ML
25-50 INJECTION, SOLUTION INTRAVENOUS
Status: DISCONTINUED | OUTPATIENT
Start: 2017-01-29 | End: 2017-01-31 | Stop reason: HOSPADM

## 2017-01-29 RX ORDER — FENTANYL CITRATE 50 UG/ML
50-100 INJECTION, SOLUTION INTRAMUSCULAR; INTRAVENOUS
Status: DISCONTINUED | OUTPATIENT
Start: 2017-01-29 | End: 2017-01-29

## 2017-01-29 RX ORDER — HYDROMORPHONE HYDROCHLORIDE 1 MG/ML
.3-.5 INJECTION, SOLUTION INTRAMUSCULAR; INTRAVENOUS; SUBCUTANEOUS EVERY 30 MIN PRN
Status: DISCONTINUED | OUTPATIENT
Start: 2017-01-29 | End: 2017-01-31 | Stop reason: HOSPADM

## 2017-01-29 RX ORDER — EPHEDRINE SULFATE 50 MG/ML
5 INJECTION, SOLUTION INTRAMUSCULAR; INTRAVENOUS; SUBCUTANEOUS
Status: DISCONTINUED | OUTPATIENT
Start: 2017-01-29 | End: 2017-01-29

## 2017-01-29 RX ORDER — BISACODYL 10 MG
10 SUPPOSITORY, RECTAL RECTAL DAILY PRN
Status: DISCONTINUED | OUTPATIENT
Start: 2017-01-31 | End: 2017-01-31 | Stop reason: HOSPADM

## 2017-01-29 RX ORDER — IBUPROFEN 800 MG/1
800 TABLET, FILM COATED ORAL
Status: DISCONTINUED | OUTPATIENT
Start: 2017-01-29 | End: 2017-01-29

## 2017-01-29 RX ORDER — AMOXICILLIN 250 MG
1-2 CAPSULE ORAL 2 TIMES DAILY
Status: DISCONTINUED | OUTPATIENT
Start: 2017-01-29 | End: 2017-01-31 | Stop reason: HOSPADM

## 2017-01-29 RX ORDER — NICOTINE POLACRILEX 4 MG
15-30 LOZENGE BUCCAL
Status: DISCONTINUED | OUTPATIENT
Start: 2017-01-29 | End: 2017-01-29

## 2017-01-29 RX ADMIN — OXYTOCIN-SODIUM CHLORIDE 0.9% IV SOLN 30 UNIT/500ML 340 ML/HR: 30-0.9/5 SOLUTION at 16:08

## 2017-01-29 RX ADMIN — ONDANSETRON 4 MG: 4 TABLET, ORALLY DISINTEGRATING ORAL at 17:23

## 2017-01-29 RX ADMIN — FENTANYL CITRATE 100 MCG: 50 INJECTION INTRAMUSCULAR; INTRAVENOUS at 13:21

## 2017-01-29 RX ADMIN — SODIUM CHLORIDE, POTASSIUM CHLORIDE, SODIUM LACTATE AND CALCIUM CHLORIDE: 600; 310; 30; 20 INJECTION, SOLUTION INTRAVENOUS at 14:00

## 2017-01-29 RX ADMIN — Medication 5 MG: at 13:52

## 2017-01-29 RX ADMIN — Medication: at 13:51

## 2017-01-29 RX ADMIN — Medication 5 MG: at 13:58

## 2017-01-29 RX ADMIN — HYDROMORPHONE HYDROCHLORIDE 0.5 MG: 10 INJECTION, SOLUTION INTRAMUSCULAR; INTRAVENOUS; SUBCUTANEOUS at 13:48

## 2017-01-29 RX ADMIN — SENNOSIDES AND DOCUSATE SODIUM 1 TABLET: 8.6; 5 TABLET ORAL at 19:44

## 2017-01-29 RX ADMIN — SODIUM CHLORIDE, POTASSIUM CHLORIDE, SODIUM LACTATE AND CALCIUM CHLORIDE: 600; 310; 30; 20 INJECTION, SOLUTION INTRAVENOUS at 12:19

## 2017-01-29 RX ADMIN — METHYLERGONOVINE MALEATE 200 MCG: 0.2 INJECTION INTRAMUSCULAR; INTRAVENOUS at 16:15

## 2017-01-29 RX ADMIN — FENTANYL CITRATE 100 MCG: 50 INJECTION INTRAMUSCULAR; INTRAVENOUS at 12:18

## 2017-01-29 RX ADMIN — IBUPROFEN 800 MG: 400 TABLET ORAL at 19:44

## 2017-01-29 NOTE — PROVIDER NOTIFICATION
01/29/17 1431   Provider Notification   Provider Name/Title    Method of Notification Phone   Notification Reason Status Update;SVE    called for an update. Pt just received an epidural, SVE 8/100/-1 with bag of water bulging. Reviewed tracing with MD.  MD will come in to hospital.

## 2017-01-29 NOTE — L&D DELIVERY NOTE
Marsha Hopkins is a 32-year-old  2, para 1 patient who presents to Labor and Delivery in labor.  She progresses following a normal Tapia curve and has epidural for analgesia.  She has artificial rupture of membranes revealing clear fluid at 9 cm.  She delivers a living female, weight has yet to be determined with Apgars of 8 and 9 over an intact perineum.  There were no cervical, vaginal or perineal lacerations present after delivery of the baby.  The baby was delivered without shoulder dystocia or nuchal cord, placed on the maternal abdomen where after 1 minute delay, the cord was clamped, cut and then handed to the nurse in attendance.  Placenta delivered spontaneously with a 3-vessel cord.  Estimated blood loss was approximately 400 cc.  Mother and  went to recovery and  nursery respectively.         MICHELL TRAMMELL MD             D: 2017 16:21   T: 2017 16:36   MT: [id number]      Name:     MARSHA HOPKINS   MRN:      [id number]        Account:        [id number]   :      1985           Delivery Date:  2017      Document: [id number]

## 2017-01-29 NOTE — ANESTHESIA PROCEDURE NOTES
Peripheral nerve/Neuraxial procedure note : epidural catheter  Pre-Procedure  Performed by MARILIA HUTCHINS  Location: OB, floor    Procedure Times:1/29/2017 1:37 PM and 1/29/2017 1:54 PM  Pre-Anesthestic Checklist: patient identified, IV checked, risks and benefits discussed, informed consent, monitors and equipment checked, pre-op evaluation and at physician/surgeon's request    Timeout  Correct Patient: Yes   Correct Procedure: Yes   Correct Site: Yes   Correct Laterality: N/A   Correct Position: Yes   Site Marked: No   .   Procedure Documentation  ASA 2  .    Procedure:    Epidural catheter.  Insertion Site:L3-4  (midline approach) Injection technique: LORT saline   Local skin infiltrated with 3 mL of 1% lidocaine.  SHANTAL at 4 cm     Patient Prep;mask, sterile gloves, povidone-iodine 7.5% surgical scrub, patient draped.  .  Needle: Davon Vargas (17 G. 3.5 in). # of attempts: 1. # of redirects:.  Spinal Needle: . . . Catheter: 19 G .  .  .  Catheter threaded easily, 9 at the skin and 5 cm in the epidural space.     Assessment/Narrative  Paresthesias: No.  .  .  Aspiration negative for heme or CSF  . Test dose of 5 mL lidocaine 1.5% w/ 1:200,000 epinephrine at 13:48.  Test dose negative for signs of intravascular, subdural or intrathecal injection. Comments:  I or my partner am immediately available. I or my partner will monitor the patient and supervise nursing care at necessary intervals.    Given 10 ml 0.125% bupivicaine infusion with 0.5 mg hydromorphone.

## 2017-01-29 NOTE — ANESTHESIA PREPROCEDURE EVALUATION
PAC NOTE:       ANESTHESIA PRE EVALUATION:  Anesthesia Evaluation       history and physical reviewed .        ROS/MED HX    ENT/Pulmonary:     (+)asthma , . .    Neurologic:  - neg neurologic ROS     Cardiovascular:  - neg cardiovascular ROS       METS/Exercise Tolerance:     Hematologic:         Musculoskeletal:         GI/Hepatic:  - neg GI/hepatic ROS       Renal/Genitourinary:         Endo:         Psychiatric:         Infectious Disease:         Malignancy:         Other:               Physical Exam      Airway     Dental     Cardiovascular       Pulmonary     Other findings:  at term, in labor, requesting pain  management    neg OB ROS            Anesthesia Plan      History & Physical Review      ASA Status:  .  OB Epidural Asa: 2       Plan for     Discussed risks of epidural catheter placement, including, but not limited to, bruising/bleeding, infection, pain, failure of epidural medications to relieve pain, dural puncture with subsequent headache/ need for epidural blood patch and nerve damage.  All questions answered, understanding voiced and she wishes to proceed.      Postoperative Care      Consents  Anesthetic plan, risks, benefits and alternatives discussed with:  Patient..                            .

## 2017-01-29 NOTE — IP AVS SNAPSHOT
[facility]    [address]    [facility] [address]    Phone:  [phone]    Fax:  [phone]                                       After Visit Summary   1/29/2017    Sarahi Gates    MRN: [id number]           After Visit Summary Signature Page     I have received my discharge instructions, and my questions have been answered. I have discussed any challenges I see with this plan with the nurse or doctor.    ..........................................................................................................................................  Patient/Patient Representative Signature      ..........................................................................................................................................  Patient Representative Print Name and Relationship to Patient    ..................................................               ................................................  Date                                            Time    ..........................................................................................................................................  Reviewed by Signature/Title    ...................................................              ..............................................  Date                                                            Time

## 2017-01-29 NOTE — PROVIDER NOTIFICATION
01/29/17 1150   Provider Notification   Provider Name/Title    Method of Notification Electronic Page;Phone   Request Evaluate - Remote   Notification Reason Patient Arrived;Labor Status;Uterine Activity;Pain;SVE;Status Update   Intrapartum orders received

## 2017-01-29 NOTE — H&P
"  2017    Sarahi Gates  [id number]            OB Admit History & Physical      Ms. Gates  is here in labor.    She has noticed painful uterine contractions    Patient's LMP from OB Dating Form was 2016.   Her Estimated Date of Delivery: 2017  , making her 39w4d  wks.      Estimated body mass index is 25.84 kg/(m^2) as calculated from the following:    Height as of 17: 1.676 m (5' 6\").    Weight as of 17: 72.576 kg (160 lb).  Her prenatal course has been un complicated         Estimated fetal weight= 7 1/2 lb       She is a 32 year old   Her OB history:   Obstetric History       T1      TAB0   SAB0   E0   M0   L1       # Outcome Date GA Lbr Jose Antonio/2nd Weight Sex Delivery Anes PTL Lv   2 Current            1 Term 13 38w4d 07:50 / 02:23 3.54 kg (7 lb 12.9 oz) M Vag-Spont EPI N       Name: Tyler      Apgar1:  9                Apgar5: 9               Past Medical History   Diagnosis Date     Environmental allergies      using zyrtec     Asthma      excercise induced     Abnormal Pap smear      most recent normal     Diabetes (H)      GESTATIONAL/DIET     Mental disorder      on meds          Past Surgical History   Procedure Laterality Date     Dental surgery  2003     wisdom teeth     Hc amniocentesis diagnostic  2016         No current outpatient prescriptions on file.       Allergies: Review of patient's allergies indicates no known allergies.      REVIEW OF SYSTEMS:  NEUROLOGIC:  Negative  EYES:  Negative  ENT:  Negative  GI:  Negative  BREAST:  Negative  :  Negative  GYN:  Negative  CV:  Negative  PULMONARY:  Negative  MUSCULOSKELETAL:  Negative  PSYCH:  Negative        Social History     Social History     Marital Status:      Spouse Name: N/A     Number of Children: 1     Years of Education: N/A     Occupational History     law student [employer]      [employer]     Social History Main Topics     Smoking status: Never Smoker      " Smokeless tobacco: Never Used     Alcohol Use: No      Comment: socially - not during pregnancy     Drug Use: No     Sexual Activity:     Partners: Male     Other Topics Concern     Parent/Sibling W/ Cabg, Mi Or Angioplasty Before 65f 55m? No     Social History Narrative    , 1 son    Employed as an  -  for [employer]       Family History   Problem Relation Age of Onset     Asthma Paternal Grandfather      Allergies Brother      Allergies Father      Allergies Paternal Grandfather      Allergies Paternal Grandmother      Thyroid Disease Mother      both hypo and hyper?     Breast Cancer       maternal great-aunt     Hypertension Mother      PIH             Vitals:     With contractions every  4 min    Alert Awake in NAD  HEENT grossly normal  Neck: no lymphadenopathy or thryoidomegaly  Lungs clear  Back no spinal or CVAT  Heart RR  ABD gravid, term on exam with vertex palpable  Pelvic:  clear fluid noted, no blood noted  Cervix is 9 cm / 90 % effaced at +1 station  EXT:  no edema or calf tenderness  Neuro:  intact    Assessment:  IUP at 39w4d  In labor     Gestational DM    Plan:  Anticipate     [unfilled]      Jackson Taylor MD  Dept of OB/GYN  2017

## 2017-01-29 NOTE — IP AVS SNAPSHOT
MRN:[id number]                      After Visit Summary   1/29/2017    Sarahi Gates    MRN: [id number]           Thank you!     Thank you for choosing [facility] for your care. Our goal is always to provide you with excellent care. Hearing back from our patients is one way we can continue to improve our services. Please take a few minutes to complete the written survey that you may receive in the mail after you visit. If you would like to speak to someone directly about your visit please contact Patient Relations at [phone]. Thank you!          Patient Information     Date Of Birth          1985        About your hospital stay     You were admitted on:  January 29, 2017 You last received care in the:  [facility] Postpartum    You were discharged on:  January 31, 2017       Who to Call     For medical emergencies, please call 911.  For non-urgent questions about your medical care, please call your primary care provider or clinic, [phone]          Attending Provider     Provider    Jackson Taylor MD Beard, Bobby Schmitz MD       Primary Care Provider Office Phone # Fax #    Bobby Styles [phone]       [place] [address] [place] [address]        After Care Instructions     Activity       Review discharge instructions            Diet       Resume previous diet            Discharge Instructions - Postpartum visit       Schedule postpartum visit with your provider and return to clinic in 6 weeks.                  Further instructions from your care team       Postpartum Vaginal Delivery Instructions  Lactation [phone]      Activity       Ask family and friends for help when you need it.    Do not place anything in your vagina for 6 weeks.    You are not restricted on other activities, but take it easy for a few weeks to allow your body to recover from delivery.  You are able to do any activities  you feel up to that point.    No driving until you have stopped taking your pain medications (usually two weeks after delivery).     Call your health care provider if you have any of these symptoms:       Increased pain, swelling, redness, or fluid around your stiches from an episiotomy or perineal tear.    A fever above 100.4 F (38 C) with or without chills when placing a thermometer under your tongue.    You soak a sanitary pad with blood within 1 hour, or you see blood clots larger than a golf ball.    Bleeding that lasts more than 6 weeks.    Vaginal discharge that smells bad.    Severe pain, cramping or tenderness in your lower belly area.    A need to urinate more frequently (use the toilet more often), more urgently (use the toilet very quickly), or it burns when you urinate.    Nausea and vomiting.    Redness, swelling or pain around a vein in your leg.    Problems breastfeeding or a red or painful area on your breast.    Chest pain and cough or are gasping for air.    Problems coping with sadness, anxiety, or depression.  If you have any concerns about hurting yourself or the baby, call your provider immediately.     You have questions or concerns after you return home.     Keep your hands clean:  Always wash your hands before touching your perineal area and stitches.  This helps reduce your risk of infection.  If your hands aren't dirty, you may use an alcohol hand-rub to clean your hands. Keep your nails clean and short.        Pending Results     No orders found from 1/28/2017 to 1/30/2017.            Statement of Approval     Ordered          01/31/17 1009  I have reviewed and agree with all the recommendations and orders detailed in this document.   EFFECTIVE NOW     Approved and electronically signed by:  Hanh Coughlin MD             Admission Information        Provider Department Dept Phone    1/29/2017 Bobby Styles MD, MD  Postpartum [phone]      Your Vitals Were     Blood Pressure  Pulse Temperature Respirations Pulse Oximetry Last Period    103/70 mmHg 59 98.4  F (36.9  C) (Oral) 18 100% 05/06/2016      [employer] Information     [employer] gives you secure access to your electronic health record. If you see a primary care provider, you can also send messages to your care team and make appointments. If you have questions, please call your primary care clinic.  If you do not have a primary care provider, please call [phone] and they will assist you.        Care EveryWhere ID     This is your Care EveryWhere ID. This could be used by other organizations to access your [place] medical records  [id number]           Review of your medicines      CONTINUE these medicines which have NOT CHANGED        Dose / Directions    albuterol 108 (90 BASE) MCG/ACT Inhaler   Commonly known as:  albuterol   Used for:  Moderate persistent asthma        Dose:  1-2 puff   Inhale 1-2 puffs into the lungs every 4 hours as needed for shortness of breath / dyspnea   Quantity:  3 Inhaler   Refills:  3       blood glucose monitoring lancets   Used for:  Gestational diabetes mellitus (GDM), antepartum, gestational diabetes method of control unspecified        Use to test blood sugars 4 times daily or as directed.   Quantity:  1 Box   Refills:  11       blood glucose monitoring meter device kit   Commonly known as:  no brand specified   Used for:  Diet controlled gestational diabetes mellitus (GDM), antepartum        Use to test blood sugar 4 times daily or as directed.   Quantity:  1 kit   Refills:  0       * blood glucose monitoring test strip   Commonly known as:  FREESTYLE LITE   Used for:  Gestational diabetes mellitus (GDM), antepartum, gestational diabetes method of control unspecified        Use to test blood sugars 4 times daily or as directed.   Quantity:  100 strip   Refills:  11       * blood glucose monitoring test strip   Commonly known as:  ACCU-CHEK ISH   Used for:  Diet controlled gestational diabetes  mellitus (GDM), antepartum        Use to test blood sugars 4 times daily or as directed.   Quantity:  100 strip   Refills:  11       breast pump Misc   Used for:  Lactation disorder, antepartum        As directed   Quantity:  1 each   Refills:  0       cetirizine 10 MG tablet   Commonly known as:  zyrTEC   Used for:  Supervision of normal first pregnancy        Dose:  10 mg   Take 1 tablet by mouth every evening.   Quantity:  30 tablet   Refills:  1       omeprazole 40 MG capsule   Commonly known as:  priLOSEC   Used for:  Gastroesophageal reflux disease without esophagitis        Dose:  40 mg   Take 1 capsule (40 mg) by mouth daily Take 30-60 minutes before a meal.   Quantity:  90 capsule   Refills:  3       PRENATAL VITAMIN PO        Refills:  0       sertraline 50 MG tablet   Commonly known as:  ZOLOFT   Used for:  Anxiety        Dose:  50 mg   Take 1 tablet (50 mg) by mouth daily   Quantity:  30 tablet   Refills:  3       * Notice:  This list has 2 medication(s) that are the same as other medications prescribed for you. Read the directions carefully, and ask your doctor or other care provider to review them with you.             Protect others around you: Learn how to safely use, store and throw away your medicines at www.disposemymeds.org.             Medication List: This is a list of all your medications and when to take them. Check marks below indicate your daily home schedule. Keep this list as a reference.      Medications           Morning Afternoon Evening Bedtime As Needed    albuterol 108 (90 BASE) MCG/ACT Inhaler   Commonly known as:  albuterol   Inhale 1-2 puffs into the lungs every 4 hours as needed for shortness of breath / dyspnea                                blood glucose monitoring lancets   Use to test blood sugars 4 times daily or as directed.                                blood glucose monitoring meter device kit   Commonly known as:  no brand specified   Use to test blood sugar 4 times  daily or as directed.                                * blood glucose monitoring test strip   Commonly known as:  FREESTYLE LITE   Use to test blood sugars 4 times daily or as directed.                                * blood glucose monitoring test strip   Commonly known as:  ACCU-CHEK ISH   Use to test blood sugars 4 times daily or as directed.                                breast pump Misc   As directed                                cetirizine 10 MG tablet   Commonly known as:  zyrTEC   Take 1 tablet by mouth every evening.   Last time this was given:  10 mg on 1/31/2017  9:43 AM                                omeprazole 40 MG capsule   Commonly known as:  priLOSEC   Take 1 capsule (40 mg) by mouth daily Take 30-60 minutes before a meal.   Last time this was given:  40 mg on 1/31/2017  9:43 AM                                PRENATAL VITAMIN PO                                sertraline 50 MG tablet   Commonly known as:  ZOLOFT   Take 1 tablet (50 mg) by mouth daily   Last time this was given:  50 mg on 1/31/2017  9:43 AM                                * Notice:  This list has 2 medication(s) that are the same as other medications prescribed for you. Read the directions carefully, and ask your doctor or other care provider to review them with you.

## 2017-01-29 NOTE — PLAN OF CARE
here to rule out labor. Pt manisha at home since early morning, becoming more consistent.   External monitors applied and admission intake completed. SVE 3/80/-, was closed in office .  Will page

## 2017-01-30 LAB
GLUCOSE BLDC GLUCOMTR-MCNC: 75 MG/DL (ref 70–99)
T PALLIDUM IGG+IGM SER QL: NEGATIVE

## 2017-01-30 PROCEDURE — 25000132 ZZH RX MED GY IP 250 OP 250 PS 637: Performed by: OBSTETRICS & GYNECOLOGY

## 2017-01-30 PROCEDURE — 12000027 ZZH R&B OB

## 2017-01-30 PROCEDURE — 00000146 ZZHCL STATISTIC GLUCOSE BY METER IP

## 2017-01-30 RX ADMIN — SERTRALINE HYDROCHLORIDE 50 MG: 50 TABLET ORAL at 10:17

## 2017-01-30 RX ADMIN — SENNOSIDES AND DOCUSATE SODIUM 1 TABLET: 8.6; 5 TABLET ORAL at 10:16

## 2017-01-30 RX ADMIN — SENNOSIDES AND DOCUSATE SODIUM 2 TABLET: 8.6; 5 TABLET ORAL at 21:02

## 2017-01-30 RX ADMIN — OMEPRAZOLE 40 MG: 20 CAPSULE, DELAYED RELEASE ORAL at 10:17

## 2017-01-30 RX ADMIN — CETIRIZINE HYDROCHLORIDE 10 MG: 10 TABLET, FILM COATED ORAL at 10:16

## 2017-01-30 NOTE — ANESTHESIA POSTPROCEDURE EVALUATION
Patient: Sarahi Gates    LABOR EPIDURAL  Additional Information* No procedures listed *    Diagnosis:* No pre-op diagnosis entered *  Diagnosis Additional Information: No value filed.    Anesthesia Type:  No value filed.    Note:  Anesthesia Post Evaluation    Patient location during evaluation: Bedside       Comments: I or my partner was immediately available for management of this patient during epidural analgesia infusion.   Patient post labor epidural catheter, doing well.  She reports good pain relief with epidural catheter.  She denies ongoing sensorimotor block, headache, fever, chills or other complaints.  All questions answered, understanding voiced.  She will have us contacted for any questions or problems.        Last vitals:  Filed Vitals:    01/29/17 2200 01/30/17 0100 01/30/17 0500   BP: 113/59 160/58 97/66   Temp: 98.2  F (36.8  C) 98  F (36.7  C) 97.6  F (36.4  C)   Resp: 18 20 22   SpO2:          Electronically Signed By: Star Persaud MD  January 30, 2017  10:00 AM

## 2017-01-30 NOTE — PLAN OF CARE
Problem: Goal Outcome Summary  Goal: Goal Outcome Summary  Outcome: Improving  VSS. Pain well controlled on ibuprofen. Voiding adequately. Eating and drinking well. Ambulating independently. Independent in infant cares. Bonding well with infant. Meeting expected outcomes.

## 2017-01-30 NOTE — PLAN OF CARE
Problem: Goal Outcome Summary  Goal: Goal Outcome Summary  Outcome: Tom Mcclain is breast feeding every 3 hours, offers no issues with breast feeding. Has had no concerns for pain. Voiding without issues. Is sleeping well between feedings.

## 2017-01-30 NOTE — PLAN OF CARE
Problem: Goal Outcome Summary  Goal: Goal Outcome Summary  Outcome: Improving  Patient meeting expected goals this shift. Able to do all self cares and cares for . Patient up walking gonzales this shift. Patient showered this shift. Education taught to patient and patient verbalized understanding. Plan to discharge tomorrow.

## 2017-01-30 NOTE — ADDENDUM NOTE
Addendum  created 01/30/17 1000 by Star Persaud MD    Modules edited: Clinical Notes    Clinical Notes:  File: [id number]

## 2017-01-30 NOTE — PROGRESS NOTES
[facility] Obstetrics Post-Partum Progress Note          Assessment and Plan:    Assessment:   Post-partum day #1  Normal spontaneous vaginal delivery  L&D complications: None      Doing well.  Clean wound without signs of infection.  Normal healing wound.  No immediate surgical complications identified.  No excessive bleeding  Pain well-controlled.      Plan:   Ambulation encouraged  Breast feeding strategies discussed  Monitor wound for signs of infection  Pain control measures as needed  Reportable signs and symptoms dicussed with the patient  Anticipate discharge tomorrow           Interval History:   Doing well.  Pain is well-controlled.  No fevers.  No history of foul-smelling vaginal discharge.  Good appetite.  Denies chest pain, shortness of breath, nausea or vomiting.  Vaginal bleeding is similar to a heavy menstrual flow.  Ambulatory.  Breastfeeding well.          Significant Problems:      Past Medical History   Diagnosis Date     Environmental allergies      using zyrtec     Asthma      excercise induced     Abnormal Pap smear      most recent normal     Diabetes (H)      GESTATIONAL/DIET     Mental disorder      on meds             Review of Systems:    The patient denies any chest pain, shortness of breath, excessive pain, fever, chills, purulent drainage from the wound, nausea or vomiting.          Medications:     All medications related to the patient's surgery have been reviewed  Current Facility-Administered Medications   Medication     albuterol (PROAIR HFA/PROVENTIL HFA/VENTOLIN HFA) Inhaler 1-2 puff     cetirizine (zyrTEC) tablet 10 mg     omeprazole (priLOSEC) CR capsule 40 mg     prenatal multivitamin  plus iron per tablet 1 tablet     sertraline (ZOLOFT) tablet 50 mg     oxytocin (PITOCIN) 30 units in 500 mL 0.9% NaCl infusion     ibuprofen (ADVIL/MOTRIN) tablet 400-800 mg     acetaminophen (TYLENOL) tablet 650 mg     naloxone (NARCAN) injection 0.1-0.4 mg     senna-docusate  (SENOKOT-S;PERICOLACE) 8.6-50 MG per tablet 1-2 tablet     [START ON 1/31/2017] bisacodyl (DULCOLAX) Suppository 10 mg     [START ON 1/31/2017] sodium phosphate (FLEET ENEMA) 1 enema     hydrocortisone 2.5 % cream     lanolin ointment     lactated ringers BOLUS 1,000 mL     oxytocin (PITOCIN) 30 units in 500 mL 0.9% NaCl infusion     oxytocin (PITOCIN) injection 10 Units     misoprostol (CYTOTEC) tablet 400 mcg     NO Rho (D) immune globulin (RhoGam) needed - mother Rh POSITIVE     oxyCODONE (ROXICODONE) IR tablet 5-10 mg     HYDROmorphone (PF) (DILAUDID) injection 0.3-0.5 mg     measles, mumps and rubella vaccine (MMR) injection 0.5 mL     Tdap (tetanus-diphtheria-acell pertussis) (ADACEL) injection 0.5 mL     glucose 40 % gel 15-30 g    Or     dextrose 50 % injection 25-50 mL    Or     glucagon injection 1 mg             Physical Exam:   Vitals were reviewed  All vitals stable  Temp: 97.6  F (36.4  C) Temp src: Oral BP: 97/66 mmHg   Heart Rate: 64 Resp: 22 SpO2: 100 %      Uterine fundus is firm, non-tender and at the level of the umbilicus          Data:     All laboratory data related to this surgery reviewed  HEMOGLOBIN   Date Value Ref Range Status   11/15/2016 10.9* 11.7 - 15.7 g/dL Final   05/31/2016 14.2 11.7 - 15.7 g/dL Final   07/30/2014 12.0 11.7 - 15.7 g/dL Final   05/21/2014 12.0 11.7 - 15.7 g/dL Final   08/21/2013 10.7* 11.7 - 15.7 g/dL Final     No imaging studies have been ordered    Steven Ken MD

## 2017-01-30 NOTE — PLAN OF CARE
Problem: Goal Outcome Summary  Goal: Goal Outcome Summary  Outcome: Improving  1815 Assisted up to ambulate to the bathroom, voided large amount without difficulty.  Denied pain at this time.  Denied nausea after zofran and is tolerating regular diet.  Patient transferred to unit at 1830 with infant. Report given to Post partum RN.   All transfer education, safety, and plan of care reviewed with parent who stated understanding.  Meeting expected goals at this time.

## 2017-01-31 VITALS
SYSTOLIC BLOOD PRESSURE: 103 MMHG | HEART RATE: 59 BPM | DIASTOLIC BLOOD PRESSURE: 70 MMHG | OXYGEN SATURATION: 100 % | RESPIRATION RATE: 18 BRPM | TEMPERATURE: 98.4 F

## 2017-01-31 LAB — HGB BLD-MCNC: 9.6 G/DL (ref 11.7–15.7)

## 2017-01-31 PROCEDURE — 25000132 ZZH RX MED GY IP 250 OP 250 PS 637: Performed by: OBSTETRICS & GYNECOLOGY

## 2017-01-31 PROCEDURE — 85018 HEMOGLOBIN: CPT | Performed by: OBSTETRICS & GYNECOLOGY

## 2017-01-31 PROCEDURE — 36415 COLL VENOUS BLD VENIPUNCTURE: CPT | Performed by: OBSTETRICS & GYNECOLOGY

## 2017-01-31 RX ADMIN — SENNOSIDES AND DOCUSATE SODIUM 2 TABLET: 8.6; 5 TABLET ORAL at 09:43

## 2017-01-31 RX ADMIN — OMEPRAZOLE 40 MG: 20 CAPSULE, DELAYED RELEASE ORAL at 09:43

## 2017-01-31 RX ADMIN — CETIRIZINE HYDROCHLORIDE 10 MG: 10 TABLET, FILM COATED ORAL at 09:43

## 2017-01-31 RX ADMIN — SERTRALINE HYDROCHLORIDE 50 MG: 50 TABLET ORAL at 09:43

## 2017-01-31 NOTE — PLAN OF CARE
Problem: Goal Outcome Summary  Goal: Goal Outcome Summary  Outcome: Adequate for Discharge Date Met:  01/31/17  Meeting goals for shift and discharge to home, see flow sheet, comfortable.

## 2017-01-31 NOTE — PLAN OF CARE
Problem: Discharge Planning  Goal: Discharge Planning (Adult, OB, Behavioral, Peds)  Outcome: Adequate for Discharge Date Met:  01/31/17  AVS/DC teaching and medications  (OTC)reviewed with patient. Patient is aware of when to call and follow-up. Patient is aware of resources available.  Aware of Sandy scale and when to retake and call.Teaching is completed, no questions.

## 2017-01-31 NOTE — PROGRESS NOTES
[facility] Obstetrics Post-Partum Progress Note          Assessment and Plan:    Assessment:   Post-partum day #2  Normal spontaneous vaginal delivery  L&D complications: None      Doing well.  No excessive bleeding  Pain well-controlled.  ABLA, asymptomatic      Plan:   Discharge later today  Home on twice a day iron           Interval History:   Doing well.  Pain is well-controlled.  No fevers.  No history of foul-smelling vaginal discharge.  Good appetite.  Denies chest pain, shortness of breath, nausea or vomiting.  Vaginal bleeding is similar to a heavy menstrual flow.  Ambulatory.  Breastfeeding well.          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     PMDD (premenstrual dysphoric disorder)     Seasonal allergic rhinitis     Cervicalgia     Right-sided low back pain without sciatica     Nonallopathic lesion of thoracolumbar region     Nonallopathic lesion of sacroiliac region     Encounter for supervision of other normal pregnancy, unspecified trimester     Prenatal care, subsequent pregnancy     Acute bilateral thoracic back pain     Family history of genetic disorder     Diet controlled gestational diabetes mellitus (GDM), antepartum     Encounter for triage in pregnant patient     Indication for care in labor or delivery     Vaginal delivery             Review of Systems:    The patient denies any chest pain, shortness of breath, excessive pain, fever, chills, purulent drainage from the wound, nausea or vomiting.          Medications:   All medications related to the patient's surgery have been reviewed          Physical Exam:     Patient Vitals for the past 12 hrs:   BP Temp Temp src Pulse Heart Rate Resp   01/31/17  0936 103/70 mmHg 98.4  F (36.9  C) Oral - 75 18   01/31/17 0337 102/67 mmHg 98.2  F (36.8  C) Oral 59 - 16     Uterine fundus is firm, non-tender and at the level of the umbilicus          Data:     HEMOGLOBIN   Date Value Ref Range Status   01/31/2017 9.6* 11.7 - 15.7 g/dL Final   11/15/2016 10.9* 11.7 - 15.7 g/dL Final       Recent Labs  Lab 01/30/17  0741 01/29/17  1245   BGM 75 86      All imaging studies related to this surgery reviewed    Hanh Coughlin MD

## 2017-01-31 NOTE — PROVIDER NOTIFICATION
01/31/17 1043   Provider Notification   Provider Name/Title Dr. Coughlin   Method of Notification Phone   Request Evaluate-Remote   Notification Reason Lab Results   Hgb 9.6, patient will take iron supplements at home.

## 2017-01-31 NOTE — PLAN OF CARE
Problem: Goal Outcome Summary  Goal: Goal Outcome Summary  Outcome: Improving  Pt VSS this shift. Pt has denied pain this shift, and declined pain medication.  Pt is able to care for self and infant independently, and is bonding with infant.  Expected d/c on 1-31-17.

## 2017-01-31 NOTE — PLAN OF CARE
Problem: Goal Outcome Summary  Goal: Goal Outcome Summary  Outcome: Improving  Patient stable and meeting expected outcomes. Independent with cares and tolerating activity well. Fundal checks and bleeding WDL. Pain managed with ice pack, rest and support person. Breastfeeding infant with no assistance from staff. Bonding with . Father of  at bedside and supportive.

## 2017-01-31 NOTE — PROGRESS NOTES
Quick Note:    Results discussed directly with patient in clinic. Further details documented in the note.     Hanh Coughlin MD  ______

## 2017-01-31 NOTE — DISCHARGE INSTRUCTIONS
Postpartum Vaginal Delivery Instructions  Lactation [phone]      Activity       Ask family and friends for help when you need it.    Do not place anything in your vagina for 6 weeks.    You are not restricted on other activities, but take it easy for a few weeks to allow your body to recover from delivery.  You are able to do any activities you feel up to that point.    No driving until you have stopped taking your pain medications (usually two weeks after delivery).     Call your health care provider if you have any of these symptoms:       Increased pain, swelling, redness, or fluid around your stiches from an episiotomy or perineal tear.    A fever above 100.4 F (38 C) with or without chills when placing a thermometer under your tongue.    You soak a sanitary pad with blood within 1 hour, or you see blood clots larger than a golf ball.    Bleeding that lasts more than 6 weeks.    Vaginal discharge that smells bad.    Severe pain, cramping or tenderness in your lower belly area.    A need to urinate more frequently (use the toilet more often), more urgently (use the toilet very quickly), or it burns when you urinate.    Nausea and vomiting.    Redness, swelling or pain around a vein in your leg.    Problems breastfeeding or a red or painful area on your breast.    Chest pain and cough or are gasping for air.    Problems coping with sadness, anxiety, or depression.  If you have any concerns about hurting yourself or the baby, call your provider immediately.     You have questions or concerns after you return home.     Keep your hands clean:  Always wash your hands before touching your perineal area and stitches.  This helps reduce your risk of infection.  If your hands aren't dirty, you may use an alcohol hand-rub to clean your hands. Keep your nails clean and short.

## 2017-03-13 ENCOUNTER — PRENATAL OFFICE VISIT (OUTPATIENT)
Dept: OBGYN | Facility: CLINIC | Age: 32
End: 2017-03-13
Payer: COMMERCIAL

## 2017-03-13 VITALS
WEIGHT: 145.3 LBS | SYSTOLIC BLOOD PRESSURE: 100 MMHG | BODY MASS INDEX: 23.45 KG/M2 | DIASTOLIC BLOOD PRESSURE: 58 MMHG | TEMPERATURE: 98.4 F | HEART RATE: 64 BPM

## 2017-03-13 PROCEDURE — 99207 ZZC POST PARTUM EXAM: CPT | Performed by: OBSTETRICS & GYNECOLOGY

## 2017-03-13 NOTE — MR AVS SNAPSHOT
After Visit Summary   3/13/2017    Sarahi Gates    MRN: [id number]           Patient Information     Date Of Birth          1985        Visit Information        Provider Department      3/13/2017 1:45 PM Bobby Styles MD [facility]        Today's Diagnoses     Routine postpartum follow-up    -  1       Follow-ups after your visit        Who to contact     If you have questions or need follow up information about today's clinic visit or your schedule please contact [facility] directly at [phone].  Normal or non-critical lab and imaging results will be communicated to you by MyChart, letter or phone within 4 business days after the clinic has received the results. If you do not hear from us within 7 days, please contact the clinic through Skyp[employer] or phone. If you have a critical or abnormal lab result, we will notify you by phone as soon as possible.  Submit refill requests through [employer] or call your pharmacy and they will forward the refill request to us. Please allow 3 business days for your refill to be completed.          Additional Information About Your Visit        MyChart Information     [employer] gives you secure access to your electronic health record. If you see a primary care provider, you can also send messages to your care team and make appointments. If you have questions, please call your primary care clinic.  If you do not have a primary care provider, please call [phone] and they will assist you.        Care EveryWhere ID     This is your Care EveryWhere ID. This could be used by other organizations to access your [facility] medical records  [id number]        Your Vitals Were     Pulse Temperature Last Period Breastfeeding? BMI (Body Mass Index)       64 98.4  F (36.9  C) (Oral) 05/06/2016 Yes 23.45 kg/m2        Blood Pressure from Last 3 Encounters:   03/13/17 100/58   01/31/17 103/70   01/25/17 120/80    Weight from Last 3  Encounters:   03/13/17 145 lb 4.8 oz (65.9 kg)   01/25/17 162 lb 4.8 oz (73.6 kg)   01/20/17 160 lb (72.6 kg)              Today, you had the following     No orders found for display       Primary Care Provider Office Phone # Fax #    Bobby Styles [phone]       [facility] [address]  [facility] [address]        Thank you!     Thank you for choosing [facility]  for your care. Our goal is always to provide you with excellent care. Hearing back from our patients is one way we can continue to improve our services. Please take a few minutes to complete the written survey that you may receive in the mail after your visit with us. Thank you!             Your Updated Medication List - Protect others around you: Learn how to safely use, store and throw away your medicines at www.disposemymeds.org.          This list is accurate as of: 3/13/17  2:18 PM.  Always use your most recent med list.                   Brand Name Dispense Instructions for use    albuterol 108 (90 BASE) MCG/ACT Inhaler    albuterol    3 Inhaler    Inhale 1-2 puffs into the lungs every 4 hours as needed for shortness of breath / dyspnea       blood glucose lancets standard    no brand specified    1 Box    Use to test blood sugar 4 times daily or as directed.       blood glucose monitoring lancets     1 Box    Use to test blood sugars 4 times daily or as directed.       * blood glucose monitoring meter device kit    no brand specified    1 kit    Use to test blood sugar 4 times daily or as directed.       * blood glucose monitoring meter device kit    no brand specified    1 kit    Use to test blood sugar 4 times daily or as directed.       * blood glucose monitoring test strip    FREESTYLE LITE    100 strip    Use to test blood sugars 4 times daily or as directed.       * blood glucose monitoring test strip    ACCU-CHEK ISH    100 strip    Use to test blood sugars 4 times daily or  as directed.       * blood glucose monitoring test strip    no brand specified    100 strip    Use to test blood sugars 4 times daily or as directed       breast pump Misc     1 each    As directed       cetirizine 10 MG tablet    zyrTEC    30 tablet    Take 1 tablet by mouth every evening.       omeprazole 40 MG capsule    priLOSEC    90 capsule    Take 1 capsule (40 mg) by mouth daily Take 30-60 minutes before a meal.       PRENATAL VITAMIN PO          sertraline 50 MG tablet    ZOLOFT    30 tablet    Take 1 tablet (50 mg) by mouth daily       * Notice:  This list has 5 medication(s) that are the same as other medications prescribed for you. Read the directions carefully, and ask your doctor or other care provider to review them with you.

## 2017-03-13 NOTE — NURSING NOTE
"Chief Complaint   Patient presents with     Post Partum Exam     Wants to discuss about birth control options and want refills on zoloft.       Initial /58 (BP Location: Left arm, Patient Position: Chair, Cuff Size: Adult Regular)  Pulse 64  Temp 98.4  F (36.9  C) (Oral)  Wt 145 lb 4.8 oz (65.9 kg)  LMP 05/06/2016  Breastfeeding? Yes  BMI 23.45 kg/m2 Estimated body mass index is 23.45 kg/(m^2) as calculated from the following:    Height as of 1/20/17: 5' 6\" (1.676 m).    Weight as of this encounter: 145 lb 4.8 oz (65.9 kg).  Medication Reconciliation: complete   Laura Tabares MA      "

## 2017-03-13 NOTE — PROGRESS NOTES
SUBJECTIVE: Sarahi is here for a 6-week postpartum checkup.    Delivery date was 17. She had a  of a viable girl, weight 7 pounds 13 oz., with Gestational Diabetes - diet controlled complications.  Since delivery, she has been breast feeding.  She has no signs of infection, bleeding or other complications.  She is not pregnant.  We discussed contraceptions and she has chosen to discuss birth control options.  She  has had intercourse since delivery and complains of No discomfort. Patient screened for postpartum depression and complaints are NEGATIVE. Screening has also been completed for intimate partner violence.    EXAM:  Today's Depression Rating was   PHQ-9 SCORE 10/26/2015   Total Score -   Total Score 2     Pelvic: normal external genitalia, normal groin lymphatics, normal urethral meatus, normal vaginal mucosa, normal cervix, normal adnexa, no masses or tenderness, uterus normal size and shape and uterus antiverted.     ASSESSMENT:   Normal postpartum exam after .    PLAN:  Return as needed or at time of next expected pap, pelvic, or breast exam.

## 2017-03-14 ASSESSMENT — PATIENT HEALTH QUESTIONNAIRE - PHQ9: SUM OF ALL RESPONSES TO PHQ QUESTIONS 1-9: 0

## 2017-03-30 ENCOUNTER — OFFICE VISIT (OUTPATIENT)
Dept: OBGYN | Facility: CLINIC | Age: 32
End: 2017-03-30
Payer: COMMERCIAL

## 2017-03-30 VITALS
BODY MASS INDEX: 22.6 KG/M2 | DIASTOLIC BLOOD PRESSURE: 60 MMHG | SYSTOLIC BLOOD PRESSURE: 98 MMHG | OXYGEN SATURATION: 99 % | HEART RATE: 60 BPM | WEIGHT: 140 LBS

## 2017-03-30 DIAGNOSIS — Z30.430 ENCOUNTER FOR IUD INSERTION: Primary | ICD-10-CM

## 2017-03-30 DIAGNOSIS — F41.9 ANXIETY: ICD-10-CM

## 2017-03-30 DIAGNOSIS — Z30.014 ENCOUNTER FOR INITIAL PRESCRIPTION OF INTRAUTERINE CONTRACEPTIVE DEVICE: ICD-10-CM

## 2017-03-30 LAB — HCG, QUAL URINE: NEGATIVE

## 2017-03-30 PROCEDURE — 58300 INSERT INTRAUTERINE DEVICE: CPT | Performed by: OBSTETRICS & GYNECOLOGY

## 2017-03-30 PROCEDURE — 87591 N.GONORRHOEAE DNA AMP PROB: CPT | Performed by: OBSTETRICS & GYNECOLOGY

## 2017-03-30 PROCEDURE — 87491 CHLMYD TRACH DNA AMP PROBE: CPT | Performed by: OBSTETRICS & GYNECOLOGY

## 2017-03-30 PROCEDURE — 84703 CHORIONIC GONADOTROPIN ASSAY: CPT | Performed by: OBSTETRICS & GYNECOLOGY

## 2017-03-30 NOTE — PROGRESS NOTES
SUBJECTIVE:Sarahi Gates, is a 32 year old , female , here for IUD insertion. Discussed the risks of infection ,and resulting infertility with IUD use. Also discussed perforation of the uterus, need for surgery to retrieve a lost IUD. Also discussed bleeding and cramping associated with IUD use.     Procedure:Exam notes a anteverted uterus. The cervix and vagina are prepped with betadine. The cervix is then grasped with  a single tooth tenaculum, the uterus is sounded, and then the IUD inserted. The strings are then cut.    Imp: Mirena  IUD insertion    Plan: Return in 2 months to check string location

## 2017-03-30 NOTE — MR AVS SNAPSHOT
After Visit Summary   3/30/2017    Sarahi Gates    MRN: [id number]           Patient Information     Date Of Birth          1985        Visit Information        Provider Department      3/30/2017 2:45 PM Bobby Styles MD [facility]        Today's Diagnoses     Encounter for IUD insertion    -  1    Anxiety        Encounter for initial prescription of intrauterine contraceptive device           Follow-ups after your visit        Your next 10 appointments already scheduled     Apr 21, 2017 12:00 PM CDT   Office Visit with Ramila Castellanos MD   [facility] ([facility] )    [address] [place] [address]  [address]  [facility] [address]   [phone]           Bring a current list of meds and any records pertaining to this visit.  For Physicals, please bring immunization records and any forms needing to be filled out.  Please arrive 10 minutes early to complete paperwork.              Who to contact     If you have questions or need follow up information about today's clinic visit or your schedule please contact [facility] directly at [phone].  Normal or non-critical lab and imaging results will be communicated to you by [employer], letter or phone within 4 business days after the clinic has received the results. If you do not hear from us within 7 days, please contact the clinic through [employer] or phone. If you have a critical or abnormal lab result, we will notify you by phone as soon as possible.  Submit refill requests through "[employer] or call your pharmacy and they will forward the refill request to us. Please allow 3 business days for your refill to be completed.          Additional Information About Your Visit        [employer] Information     "[employer] gives you secure access to your electronic health record. If you see a primary care provider, you can also send messages to your  care team and make appointments. If you have questions, please call your primary care clinic.  If you do not have a primary care provider, please call [phone] and they will assist you.        Care EveryWhere ID     This is your Care EveryWhere ID. This could be used by other organizations to access your [facility] medical records  [id number]        Your Vitals Were     Pulse Last Period Pulse Oximetry Breastfeeding? BMI (Body Mass Index)       60 05/06/2016 99% Yes 22.6 kg/m2        Blood Pressure from Last 3 Encounters:   03/30/17 98/60   03/13/17 100/58   01/31/17 103/70    Weight from Last 3 Encounters:   03/30/17 140 lb (63.5 kg)   03/13/17 145 lb 4.8 oz (65.9 kg)   01/25/17 162 lb 4.8 oz (73.6 kg)              We Performed the Following     CHLAMYDIA TRACHOMATIS PCR     HC LEVONORGESTREL IU 52MG 5 YR     HCL HCG, URINE, NURSE BACKOFFICE     INSERTION INTRAUTERINE DEVICE     NEISSERIA GONORRHOEA PCR          Where to get your medicines      These medications were sent to [facility] [address] [place] [address] [address] AT [facility] [address], [facility] [address]    Hours:  24-hours Phone:  [phone]     sertraline 50 MG tablet          Primary Care Provider Office Phone # Fax #    Bobby Styles [phone]       [facility] [address] [place] [address]        Thank you!     Thank you for choosing [facility]  for your care. Our goal is always to provide you with excellent care. Hearing back from our patients is one way we can continue to improve our services. Please take a few minutes to complete the written survey that you may receive in the mail after your visit with us. Thank you!             Your Updated Medication List - Protect others around you: Learn how to safely use, store and throw away your medicines at www.disposemymeds.org.          This list is accurate as of: 3/30/17  11:59 PM.  Always use your most recent med list.                   Brand Name Dispense Instructions for use    albuterol 108 (90 BASE) MCG/ACT Inhaler    albuterol    3 Inhaler    Inhale 1-2 puffs into the lungs every 4 hours as needed for shortness of breath / dyspnea       blood glucose lancets standard    no brand specified    1 Box    Use to test blood sugar 4 times daily or as directed.       blood glucose monitoring lancets     1 Box    Use to test blood sugars 4 times daily or as directed.       * blood glucose monitoring meter device kit    no brand specified    1 kit    Use to test blood sugar 4 times daily or as directed.       * blood glucose monitoring meter device kit    no brand specified    1 kit    Use to test blood sugar 4 times daily or as directed.       * blood glucose monitoring test strip    FREESTYLE LITE    100 strip    Use to test blood sugars 4 times daily or as directed.       * blood glucose monitoring test strip    ACCU-CHEK ISH    100 strip    Use to test blood sugars 4 times daily or as directed.       * blood glucose monitoring test strip    no brand specified    100 strip    Use to test blood sugars 4 times daily or as directed       breast pump Misc     1 each    As directed       cetirizine 10 MG tablet    zyrTEC    30 tablet    Take 1 tablet by mouth every evening.       levonorgestrel 20 MCG/24HR IUD    MIRENA     1 each by Intrauterine route once       omeprazole 40 MG capsule    priLOSEC    90 capsule    Take 1 capsule (40 mg) by mouth daily Take 30-60 minutes before a meal.       PRENATAL VITAMIN PO          sertraline 50 MG tablet    ZOLOFT    90 tablet    Take 1 tablet (50 mg) by mouth daily       * Notice:  This list has 5 medication(s) that are the same as other medications prescribed for you. Read the directions carefully, and ask your doctor or other care provider to review them with you.

## 2017-04-02 LAB
C TRACH DNA SPEC QL NAA+PROBE: NORMAL
N GONORRHOEA DNA SPEC QL NAA+PROBE: NORMAL
SPECIMEN SOURCE: NORMAL
SPECIMEN SOURCE: NORMAL

## 2017-04-14 ENCOUNTER — THERAPY VISIT (OUTPATIENT)
Dept: CHIROPRACTIC MEDICINE | Facility: CLINIC | Age: 32
End: 2017-04-14
Payer: COMMERCIAL

## 2017-04-14 DIAGNOSIS — M54.50 ACUTE RIGHT-SIDED LOW BACK PAIN WITHOUT SCIATICA: Primary | ICD-10-CM

## 2017-04-14 DIAGNOSIS — M54.2 CERVICALGIA: ICD-10-CM

## 2017-04-14 DIAGNOSIS — M99.04 SOMATIC DYSFUNCTION OF SACRAL REGION: ICD-10-CM

## 2017-04-14 DIAGNOSIS — M99.02 THORACIC SEGMENT DYSFUNCTION: ICD-10-CM

## 2017-04-14 PROCEDURE — 98941 CHIROPRACT MANJ 3-4 REGIONS: CPT | Mod: AT | Performed by: CHIROPRACTOR

## 2017-04-14 NOTE — MR AVS SNAPSHOT
After Visit Summary   4/14/2017    Sarahi Gates    MRN: [id number]           Patient Information     Date Of Birth          1985        Visit Information        Provider Department      4/14/2017 12:15 PM Connor Handy DC [facility]        Today's Diagnoses     Acute right-sided low back pain without sciatica    -  1    Cervicalgia        Thoracic segment dysfunction        Somatic dysfunction of sacral region           Follow-ups after your visit        Your next 10 appointments already scheduled     Apr 21, 2017 12:00 PM CDT   Office Visit with Ramila Castellanos MD   [facility] ([facility] )    [address] [place] [address]  [address]  [facility] [address] [phone]           Bring a current list of meds and any records pertaining to this visit.  For Physicals, please bring immunization records and any forms needing to be filled out.  Please arrive 10 minutes early to complete paperwork.              Who to contact     If you have questions or need follow up information about today's clinic visit or your schedule please contact [facility] directly at [phone].  Normal or non-critical lab and imaging results will be communicated to you by MyChart, letter or phone within 4 business days after the clinic has received the results. If you do not hear from us within 7 days, please contact the clinic through [employer] or phone. If you have a critical or abnormal lab result, we will notify you by phone as soon as possible.  Submit refill requests through [employer] or call your pharmacy and they will forward the refill request to us. Please allow 3 business days for your refill to be completed.          Additional Information About Your Visit        [employer] Information     [employer] gives you secure access to your electronic health record.  If you see a primary care provider, you can also send messages to your care team and make appointments. If you have questions, please call your primary care clinic.  If you do not have a primary care provider, please call [phone] and they will assist you.        Care EveryWhere ID     This is your Care EveryWhere ID. This could be used by other organizations to access your [facility] medical records  [id number]        Your Vitals Were     Last Period                   05/06/2016            Blood Pressure from Last 3 Encounters:   03/30/17 98/60   03/13/17 100/58   01/31/17 103/70    Weight from Last 3 Encounters:   03/30/17 63.5 kg (140 lb)   03/13/17 65.9 kg (145 lb 4.8 oz)   01/25/17 73.6 kg (162 lb 4.8 oz)              We Performed the Following     CHIROPRAC MANIP,SPINAL,3-4 REGIONS        Primary Care Provider Office Phone # Fax #    Bobby tSyles -[phone]       [facility] [address] [place] [address]        Thank you!     Thank you for choosing [facility]  for your care. Our goal is always to provide you with excellent care. Hearing back from our patients is one way we can continue to improve our services. Please take a few minutes to complete the written survey that you may receive in the mail after your visit with us. Thank you!             Your Updated Medication List - Protect others around you: Learn how to safely use, store and throw away your medicines at www.disposemymeds.org.          This list is accurate as of: 4/14/17 11:59 PM.  Always use your most recent med list.                   Brand Name Dispense Instructions for use    albuterol 108 (90 BASE) MCG/ACT Inhaler    albuterol    3 Inhaler    Inhale 1-2 puffs into the lungs every 4 hours as needed for shortness of breath / dyspnea       blood glucose lancets standard    no brand specified    1 Box    Use to test blood sugar 4 times daily or  as directed.       blood glucose monitoring lancets     1 Box    Use to test blood sugars 4 times daily or as directed.       * blood glucose monitoring meter device kit    no brand specified    1 kit    Use to test blood sugar 4 times daily or as directed.       * blood glucose monitoring meter device kit    no brand specified    1 kit    Use to test blood sugar 4 times daily or as directed.       * blood glucose monitoring test strip    FREESTYLE LITE    100 strip    Use to test blood sugars 4 times daily or as directed.       * blood glucose monitoring test strip    ACCU-CHEK ISH    100 strip    Use to test blood sugars 4 times daily or as directed.       * blood glucose monitoring test strip    no brand specified    100 strip    Use to test blood sugars 4 times daily or as directed       breast pump Misc     1 each    As directed       cetirizine 10 MG tablet    zyrTEC    30 tablet    Take 1 tablet by mouth every evening.       levonorgestrel 20 MCG/24HR IUD    MIRENA     1 each by Intrauterine route once       omeprazole 40 MG capsule    priLOSEC    90 capsule    Take 1 capsule (40 mg) by mouth daily Take 30-60 minutes before a meal.       PRENATAL VITAMIN PO          sertraline 50 MG tablet    ZOLOFT    90 tablet    Take 1 tablet (50 mg) by mouth daily       * Notice:  This list has 5 medication(s) that are the same as other medications prescribed for you. Read the directions carefully, and ask your doctor or other care provider to review them with you.

## 2017-04-16 PROBLEM — M99.02 THORACIC SEGMENT DYSFUNCTION: Status: ACTIVE | Noted: 2017-04-16

## 2017-04-16 PROBLEM — M54.50 ACUTE RIGHT-SIDED LOW BACK PAIN WITHOUT SCIATICA: Status: ACTIVE | Noted: 2017-04-16

## 2017-04-16 PROBLEM — M99.04 SOMATIC DYSFUNCTION OF SACRAL REGION: Status: ACTIVE | Noted: 2017-04-16

## 2017-04-16 NOTE — PROGRESS NOTES
Visit #:  1/2017    Subjective:  Sarahi Gates is a 31 year old female who is seen in f/u up for:        Cervicalgia  Acute right-sided low back pain without sciatica  Thoracic segment dysfunction  Somatic dysfunction of sacral region.     Since last visit on Visit,  Sarahi Gates reports: tightness in the neck and the lower back area more on the right side. She has given birth in February of this year and reports irritation on the right side of the body graded a 5/10 on VAS. The pt has returned to work recently and is seated for long periods. She feels restriction on the R side of the low back and hip area. She is returning to physical activity with no issues. The pt denies weakness in the extremities, HA or other unusual sx.       Area of chief complaint:  Cervical and Thoracic :  Symptoms are graded at 5-6/10. The quality is described as stiff, dull.      P: pain elicited on palpation, cervical , thoracic and lumbar   A: static palpation demonstrates intersegmental asymmetry , C2, C2, C7, T1, L4, L5, SACRUM, R PSIS  R: motion palpation notes restricted motion  T: hypertonicity at: Levator scapulae and Quad lumb Bilaterally    Segmental spinal dysfunction/restrictions found at:  C2, C3, T5, T6, L4, L5, SACRUM, R PSIS.      Assessment:    Diagnoses:      1. Acute right-sided low back pain without sciatica    2. Cervicalgia    3. Thoracic segment dysfunction    4. Somatic dysfunction of sacral region        Patient's condition:  Exacerbation/regression    Treatment effectiveness:  No increase in sx, pt stable       Procedures:  CMT:  49661 Chiropractic manipulative treatment 3-4 regions performed   Cervical: Diversified, C2, C3 , C7 , Prone, Supine  Thoracic: Diversified, T5, T6, Prone  Lumbar: Diversified, L4, L5, Side posture  Pelvis: Drop Table, Sacrum , PSIS Right , Prone    Modalities:  None performed this visit    Therapeutic procedures:  None    Response to Treatment  Reduction of symptoms  No increase in  sx pt stable     Prognosis: Excellent    Progress towards Goals: Pending    Recommendations:    Instructions:none     Follow-up:  Return to care in none.   Pt should FU is sx persist

## 2017-04-21 ENCOUNTER — OFFICE VISIT (OUTPATIENT)
Dept: FAMILY MEDICINE | Facility: CLINIC | Age: 32
End: 2017-04-21
Payer: COMMERCIAL

## 2017-04-21 DIAGNOSIS — Z12.83 SKIN CANCER SCREENING: Primary | ICD-10-CM

## 2017-04-21 DIAGNOSIS — D22.9 MULTIPLE BENIGN MELANOCYTIC NEVI: ICD-10-CM

## 2017-04-21 DIAGNOSIS — Z80.8 FAMILY HISTORY OF MALIGNANT MELANOMA OF SKIN: ICD-10-CM

## 2017-04-21 PROCEDURE — 99213 OFFICE O/P EST LOW 20 MIN: CPT | Performed by: FAMILY MEDICINE

## 2017-04-21 NOTE — PROGRESS NOTES
[facility] - PRIMARY CARE SKIN    CC : skin cancer screening (full-body)  SUBJECTIVE:                                                    Sarahi Gates is a 32 year old female who presents to clinic today for a full-body skin exam because of her numerous freckles and moles.    No bothersome lesions noticed by the patient or other skin concerns, although she has noticed some skin tags on the arms that developed during pregnancy.    Personal history of skin cancer : NO.  Family history of skin cancer : YES - melanoma in paternal grandfather, precancerous lesions in father.    Sun Exposure History  Previous history of significant sun exposure: YES - she is an avid runner outdoors.  Blistering sunburns : YES - about 10 times  Tanning beds : YES - when younger.  Sunscreen Use : YES, SPF : 55+.  UV-protective clothing use : NO  Wide-brimmed hats : YES  UV-protective sunglasses : YES  Avoids mid-day sun : NO    Occupation :  (indoor).    Refer to electronic medical record (EMR) for past medical history and medications.    INTEGUMENTARY/SKIN: NEGATIVE for worrisome rashes, moles or lesions  ROS : 14 point review of systems was negative except the symptoms listed above in the HPI.    This document serves as a record of the services and decisions personally performed and made by Danna Castellanos MD. It was created on her behalf by Maury Garnica, a trained medical scribe.  The creation of this document is based on the scribe's personal observations and the provider's statements to the medical scribe.  Maury Garnica, April 21, 2017 11:57 AM      OBJECTIVE:                                                    GENERAL: healthy, alert and no distress  SKIN: Mercedes Skin Type - I.  This patient was examined from the top of the head to the bottom of the feet  including scalp, face, neck, back, chest, breasts, buttocks, both arms, both legs, both hands, both feet, all 10 fingers and all 10 toes. The dermatoscope was used to  "help evaluate pigmented lesions.  Skin Pertinent Findings:  Arms : Scattered, multiple, 1 mm - 5 mm in size, brown macules most consistent with benign nevi (melanocytic nevi).    Left great toe, lateral side : 3 mm in size brown macule most consistent with a benign nevus.    Back : Scattered, 2 mm - 4 mm in size, brown macules most consistent with benign nevi (melanocytic nevi).    Buttocks : Scattered, 1 mm in size, brown macules most consistent with benign nevi (melanocytic nevi). Dermoscopy - no suspicious characteristics.    Diagnostic Test Results:  none           ASSESSMENT:                                                      Encounter Diagnoses   Name Primary?     Skin cancer screening Yes     Family history of malignant melanoma of skin      Multiple benign melanocytic nevi          PLAN:                                                    Patient Instructions   FUTURE APPOINTMENTS  Follow up in 1 year(s) for a full-body skin cancer screening.    TIPS FOR AVOIDING SKIN CANCER AND PREMATURE SKIN AGING  DOs    Wear a wide-brimmed hat and sunglasses.     Wear sun-protective clothing.    [employer] and other [employer] make sun protective clothing that is stylish, comfortable and cool.    [employer] and other [employer] make UV arm sleeves suitable for golfing, gardening and other activities.      Wear sunscreen on your face every day, even in the winter. (UVA \"aging rays\" penetrates window glass and is just as strong in the winter as in the summer) Sunscreen with SPF > 30 is recommended.    Wear sunscreen on your body and re-apply every 2 hours when exposed to sun. Sunscreen with SPF > 50 is recommended.    You should use about 3 tablespoons of sunscreen to protect your whole body. Thus a typical eight ounce bottle of sunscreen should last 4 applications. Remember, that the SPF rating is compromised if you don t apply enough. Most people only apply 1/2 - 1/3 of the amount they need. Also don t forget " "areas such as your ears, feet, upper back and harder to reach places. Keep in mind that these amounts should be increased for larger body sizes.    Note that spray sunscreens are only for touch-up application, not as a base layer. Also, use spray sunscreens with caution around small children due to inhalation risk.    Product Recommendations:    Look for broad spectrum sunscreen (blocks both UVA and UVB).    Look for titanium dioxide and/or zinc oxide in the active ingredients, which are physical blockers as opposed to chemical blockers. Chemical-free sunscreens should not irritate the skin.    Good examples include: Blue Lizard, EltaMD, Vanicream, Solbar, CeraVe.     For sensitive skin, consider : SkinMedica Essential Defense Mineral Shield Broad Spectrum SPF 35      Avoid combination products that include both sunscreen and insect repellant, as sunscreen should be applied every 2 hours, but insect repellant should not be applied as frequently.    Avoid products that include oxybenzone or retinyl palmitate.    For more information:  http://www.skincancer.org/prevention/sun-protection/sunscreen/sunscreens-safe-and-effective    DON'Ts    All tanning damages the skin. Aim for ivory skin year round and you will have less trouble with your skin in years to come. There is no merit in getting \"a base tan\" before a warm weather vacation, as any tanning indicates your body's response to sun damage.    Never use tanning beds. Tanning beds are associated with much higher risks of skin cancer.    Avoid mid-day sunshine (10 AM to 3 PM), if possible.    Stop smoking. Smokers have higher rates of skin cancer and also have premature skin wrinkling.    The patient was counseled about sunscreens and sun avoidance. The patient was counseled to check the skin regularly and report any lesion that is new, changing, itching, scabbing, bleeding or otherwise bothersome. The patient was discharged ambulatory and in stable " condition.    Recommendations : q1 year skin exams.      PROCEDURES:                                                    None.    TT : 25 minutes.  CT : 15 minutes.      The information in this document, created by the medical scribe for me, accurately reflects the services I personally performed and the decisions made by me. I have reviewed and approved this document for accuracy prior to leaving the patient care area.  Danna Castellanos MD April 21, 2017 11:57 AM  [facility] - PRIMARY CARE SKIN

## 2017-04-21 NOTE — PATIENT INSTRUCTIONS
"FUTURE APPOINTMENTS  Follow up in 1 year(s) for a full-body skin cancer screening.    TIPS FOR AVOIDING SKIN CANCER AND PREMATURE SKIN AGING  DOs    Wear a wide-brimmed hat and sunglasses.     Wear sun-protective clothing.    [employer] and other [employer] make sun protective clothing that is stylish, comfortable and cool.    [employer] and other [employer] make UV arm sleeves suitable for golfing, gardening and other activities.      Wear sunscreen on your face every day, even in the winter. (UVA \"aging rays\" penetrates window glass and is just as strong in the winter as in the summer) Sunscreen with SPF > 30 is recommended.    Wear sunscreen on your body and re-apply every 2 hours when exposed to sun. Sunscreen with SPF > 50 is recommended.    You should use about 3 tablespoons of sunscreen to protect your whole body. Thus a typical eight ounce bottle of sunscreen should last 4 applications. Remember, that the SPF rating is compromised if you don t apply enough. Most people only apply 1/2 - 1/3 of the amount they need. Also don t forget areas such as your ears, feet, upper back and harder to reach places. Keep in mind that these amounts should be increased for larger body sizes.    Note that spray sunscreens are only for touch-up application, not as a base layer. Also, use spray sunscreens with caution around small children due to inhalation risk.    Product Recommendations:    Look for broad spectrum sunscreen (blocks both UVA and UVB).    Look for titanium dioxide and/or zinc oxide in the active ingredients, which are physical blockers as opposed to chemical blockers. Chemical-free sunscreens should not irritate the skin.    Good examples include: Blue Lizard, EltaMD, Vanicream, Solbar, CeraVe.     For sensitive skin, consider : SkinMedica Essential Defense Mineral Shield Broad Spectrum SPF 35      Avoid combination products that include both sunscreen and insect repellant, as sunscreen should be " "applied every 2 hours, but insect repellant should not be applied as frequently.    Avoid products that include oxybenzone or retinyl palmitate.    For more information:  http://www.skincancer.org/prevention/sun-protection/sunscreen/sunscreens-safe-and-effective    DON'Ts    All tanning damages the skin. Aim for ivory skin year round and you will have less trouble with your skin in years to come. There is no merit in getting \"a base tan\" before a warm weather vacation, as any tanning indicates your body's response to sun damage.    Never use tanning beds. Tanning beds are associated with much higher risks of skin cancer.    Avoid mid-day sunshine (10 AM to 3 PM), if possible.    Stop smoking. Smokers have higher rates of skin cancer and also have premature skin wrinkling.  "

## 2017-04-21 NOTE — MR AVS SNAPSHOT
"              After Visit Summary   4/21/2017    Sarahi Gates    MRN: [id number]           Patient Information     Date Of Birth          1985        Visit Information        Provider Department      4/21/2017 12:00 PM Ramila Castellanos MD [facility] - Primary Care Skin        Today's Diagnoses     Skin cancer screening    -  1    Family history of malignant melanoma of skin        Multiple benign melanocytic nevi          Care Instructions    FUTURE APPOINTMENTS  Follow up in 1 year(s) for a full-body skin cancer screening.    TIPS FOR AVOIDING SKIN CANCER AND PREMATURE SKIN AGING  DOs    Wear a wide-brimmed hat and sunglasses.     Wear sun-protective clothing.    [employer] and other [employer] make sun protective clothing that is stylish, comfortable and cool.    [employer] [place] and other companies make UV arm sleeves suitable for golfing, gardening and other activities.      Wear sunscreen on your face every day, even in the winter. (UVA \"aging rays\" penetrates window glass and is just as strong in the winter as in the summer) Sunscreen with SPF > 30 is recommended.    Wear sunscreen on your body and re-apply every 2 hours when exposed to sun. Sunscreen with SPF > 50 is recommended.    You should use about 3 tablespoons of sunscreen to protect your whole body. Thus a typical eight ounce bottle of sunscreen should last 4 applications. Remember, that the SPF rating is compromised if you don t apply enough. Most people only apply 1/2 - 1/3 of the amount they need. Also don t forget areas such as your ears, feet, upper back and harder to reach places. Keep in mind that these amounts should be increased for larger body sizes.    Note that spray sunscreens are only for touch-up application, not as a base layer. Also, use spray sunscreens with caution around small children due to inhalation risk.    Product Recommendations:    Look for broad spectrum sunscreen (blocks both UVA and " "UVB).    Look for titanium dioxide and/or zinc oxide in the active ingredients, which are physical blockers as opposed to chemical blockers. Chemical-free sunscreens should not irritate the skin.    Good examples include: Blue Lizard, EltaMD, Vanicream, Solbar, CeraVe.     For sensitive skin, consider : SkinMedica Essential Defense Mineral Shield Broad Spectrum SPF 35      Avoid combination products that include both sunscreen and insect repellant, as sunscreen should be applied every 2 hours, but insect repellant should not be applied as frequently.    Avoid products that include oxybenzone or retinyl palmitate.    For more information:  http://www.skincancer.org/prevention/sun-protection/sunscreen/sunscreens-safe-and-effective    DON'Ts    All tanning damages the skin. Aim for ivory skin year round and you will have less trouble with your skin in years to come. There is no merit in getting \"a base tan\" before a warm weather vacation, as any tanning indicates your body's response to sun damage.    Never use tanning beds. Tanning beds are associated with much higher risks of skin cancer.    Avoid mid-day sunshine (10 AM to 3 PM), if possible.    Stop smoking. Smokers have higher rates of skin cancer and also have premature skin wrinkling.        Follow-ups after your visit        Who to contact     If you have questions or need follow up information about today's clinic visit or your schedule please contact [facility] - PRIMARY CARE SKIN directly at [phone].  Normal or non-critical lab and imaging results will be communicated to you by MyChart, letter or phone within 4 business days after the clinic has received the results. If you do not hear from us within 7 days, please contact the clinic through [employer] or phone. If you have a critical or abnormal lab result, we will notify you by phone as soon as possible.  Submit refill requests through [employer] or call your pharmacy and they will forward the refill " request to us. Please allow 3 business days for your refill to be completed.          Additional Information About Your Visit        [employer] Information     [employer] gives you secure access to your electronic health record. If you see a primary care provider, you can also send messages to your care team and make appointments. If you have questions, please call your primary care clinic.  If you do not have a primary care provider, please call [phone] and they will assist you.        Care EveryWhere ID     This is your Care EveryWhere ID. This could be used by other organizations to access your [facility] medical records  [id number]        Your Vitals Were     Last Period                   05/06/2016            Blood Pressure from Last 3 Encounters:   03/30/17 98/60   03/13/17 100/58   01/31/17 103/70    Weight from Last 3 Encounters:   03/30/17 140 lb (63.5 kg)   03/13/17 145 lb 4.8 oz (65.9 kg)   01/25/17 162 lb 4.8 oz (73.6 kg)              Today, you had the following     No orders found for display       Primary Care Provider Office Phone # Fax #    Bobby Styles [phone]       [facility] [address] [place] [address]        Thank you!     Thank you for choosing [facility] - PRIMARY CARE [facility]  for your care. Our goal is always to provide you with excellent care. Hearing back from our patients is one way we can continue to improve our services. Please take a few minutes to complete the written survey that you may receive in the mail after your visit with us. Thank you!             Your Updated Medication List - Protect others around you: Learn how to safely use, store and throw away your medicines at www.disposemymeds.org.          This list is accurate as of: 4/21/17 12:11 PM.  Always use your most recent med list.                   Brand Name Dispense Instructions for use    albuterol 108 (90 BASE) MCG/ACT Inhaler    albuterol    3 Inhaler    Inhale  1-2 puffs into the lungs every 4 hours as needed for shortness of breath / dyspnea       cetirizine 10 MG tablet    zyrTEC    30 tablet    Take 1 tablet by mouth every evening.       levonorgestrel 20 MCG/24HR IUD    MIRENA     1 each by Intrauterine route once       PRENATAL VITAMIN PO          sertraline 50 MG tablet    ZOLOFT    90 tablet    Take 1 tablet (50 mg) by mouth daily

## 2017-05-17 ENCOUNTER — OFFICE VISIT (OUTPATIENT)
Dept: OBGYN | Facility: CLINIC | Age: 32
End: 2017-05-17
Payer: COMMERCIAL

## 2017-05-17 VITALS — BODY MASS INDEX: 22.27 KG/M2 | SYSTOLIC BLOOD PRESSURE: 108 MMHG | WEIGHT: 138 LBS | DIASTOLIC BLOOD PRESSURE: 78 MMHG

## 2017-05-17 DIAGNOSIS — Z30.432 ENCOUNTER FOR IUD REMOVAL: ICD-10-CM

## 2017-05-17 DIAGNOSIS — F32.81 PMDD (PREMENSTRUAL DYSPHORIC DISORDER): Primary | ICD-10-CM

## 2017-05-17 DIAGNOSIS — M25.559 PAIN IN JOINT, PELVIC REGION AND THIGH, UNSPECIFIED LATERALITY: ICD-10-CM

## 2017-05-17 PROCEDURE — 58301 REMOVE INTRAUTERINE DEVICE: CPT | Performed by: OBSTETRICS & GYNECOLOGY

## 2017-05-17 PROCEDURE — 99213 OFFICE O/P EST LOW 20 MIN: CPT | Mod: 25 | Performed by: OBSTETRICS & GYNECOLOGY

## 2017-05-17 RX ORDER — NORETHINDRONE ACETATE AND ETHINYL ESTRADIOL .03; 1.5 MG/1; MG/1
TABLET ORAL
Qty: 112 TABLET | Refills: 4 | Status: SHIPPED | OUTPATIENT
Start: 2017-05-17 | End: 2017-08-01

## 2017-05-17 ASSESSMENT — ANXIETY QUESTIONNAIRES
6. BECOMING EASILY ANNOYED OR IRRITABLE: SEVERAL DAYS
5. BEING SO RESTLESS THAT IT IS HARD TO SIT STILL: NOT AT ALL
IF YOU CHECKED OFF ANY PROBLEMS ON THIS QUESTIONNAIRE, HOW DIFFICULT HAVE THESE PROBLEMS MADE IT FOR YOU TO DO YOUR WORK, TAKE CARE OF THINGS AT HOME, OR GET ALONG WITH OTHER PEOPLE: NOT DIFFICULT AT ALL
GAD7 TOTAL SCORE: 3
3. WORRYING TOO MUCH ABOUT DIFFERENT THINGS: NOT AT ALL
2. NOT BEING ABLE TO STOP OR CONTROL WORRYING: NOT AT ALL
7. FEELING AFRAID AS IF SOMETHING AWFUL MIGHT HAPPEN: NOT AT ALL
1. FEELING NERVOUS, ANXIOUS, OR ON EDGE: SEVERAL DAYS

## 2017-05-17 ASSESSMENT — PATIENT HEALTH QUESTIONNAIRE - PHQ9: 5. POOR APPETITE OR OVEREATING: SEVERAL DAYS

## 2017-05-17 NOTE — PROGRESS NOTES
SUBJECTIVE:                                                   Sarahi Gates is a 32 year old female who presents to clinic today for the following health issue(s):  Patient presents with:  IUD: possible removal, lots of cramping and nausea, bloating      Additional information:     HPI:  Patient has a lot of trouble with pmdd  Has tried suppression with seasonale, and similar, and other ocp  We are going to try complete supression with microgestin . and have her keep a calender  iud removed today due to a lot of pain since it was inserted, no sign of stem seen on exam    She is an  so high stress work environment  Continue her zoloft  See me 3 months so we can check in and see how things are going    No LMP recorded. Patient is not currently having periods (Reason: IUD)..   Patient is sexually active, .  Using IUD for contraception.    reports that she has never smoked. She has never used smokeless tobacco.    STD testing offered?  Declined    Health maintenance updated:  yes    Today's PHQ-2 Score:   PHQ-2 (  [employer]) 10/26/2015   Q1: Little interest or pleasure in doing things 0   Q2: Feeling down, depressed or hopeless 0   PHQ-2 Score 0     Today's PHQ-9 Score:   PHQ-9 SCORE 2017   Total Score -   Total Score 0     Today's RODGER-7 Score:   RODGER-7 SCORE 2017   Total Score 3   Total Score -       Problem list and histories reviewed & adjusted, as indicated.  Additional history: as documented.    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     PMDD (premenstrual dysphoric disorder)     Seasonal allergic rhinitis     Cervicalgia     Right-sided low back pain without sciatica     Encounter for supervision of other normal pregnancy,  unspecified trimester     Prenatal care, subsequent pregnancy     Family history of genetic disorder     Diet controlled gestational diabetes mellitus (GDM), antepartum     Encounter for triage in pregnant patient     Indication for care in labor or delivery     Vaginal delivery     Acute right-sided low back pain without sciatica     Thoracic segment dysfunction     Somatic dysfunction of sacral region     Family history of malignant melanoma of skin     Past Surgical History:   Procedure Laterality Date     DENTAL SURGERY  2003    wisdom teeth     HC AMNIOCENTESIS DIAGNOSTIC  12/7/2016      Social History   Substance Use Topics     Smoking status: Never Smoker     Smokeless tobacco: Never Used     Alcohol use No      Comment: socially - not during pregnancy      Problem (# of Occurrences) Relation (Name,Age of Onset)    Allergies (4) Paternal Grandfather, Paternal Grandmother, Father, Brother    Asthma (1) Paternal Grandfather    Breast Cancer (1) Other: maternal great-aunt    Hypertension (1) Mother: PIH    Thyroid Disease (1) Mother: both hypo and hyper?            Current Outpatient Prescriptions   Medication Sig     norethindrone-ethinyl estradiol (MICROGESTIN 1.5/30) 1.5-30 MG-MCG per tablet Take one tab daily continuous without placebo week, fill 4 pkg for 3 months     levonorgestrel (MIRENA) 20 MCG/24HR IUD 1 each by Intrauterine route once     sertraline (ZOLOFT) 50 MG tablet Take 1 tablet (50 mg) by mouth daily     Prenatal Vit-Fe Fumarate-FA (PRENATAL VITAMIN PO)      cetirizine (ZYRTEC) 10 MG tablet Take 1 tablet by mouth every evening.     albuterol (ALBUTEROL) 108 (90 BASE) MCG/ACT inhaler Inhale 1-2 puffs into the lungs every 4 hours as needed for shortness of breath / dyspnea (Patient not taking: Reported on 5/17/2017)     No current facility-administered medications for this visit.      No Known Allergies    ROS:  12 point review of systems negative other than symptoms noted  below.  Gastrointestinal: Abdominal Pain, Bloating, Heartburn and Nausea  Genitourinary: Cramps and Significant PMS    OBJECTIVE:     /78  Wt 138 lb (62.6 kg)  BMI 22.27 kg/m2  Body mass index is 22.27 kg/(m^2).    Exam:  Constitutional:  Appearance: Well nourished, well developed alert, in no acute distress  Neurologic/Psychiatric:  Mental Status:  Oriented X3   Pelvic Exam:  External Genitalia:     Normal appearance for age, no discharge present, no tenderness present, no inflammatory lesions present, color normal  Vagina:    Normal vaginal vault without central or paravaginal defects, no discharge present, no inflammatory lesions present, no masses present  Bladder:     Nontender to palpation  Urethra:   Urethral Body:  Urethra palpation normal, urethra structural support normal   Urethral Meatus:  No erythema or lesions present  Cervix:     Appearance healthy, no lesions present, nontender to palpation, no bleeding present, string present  Uterus:     Nontender to palpation, no masses present, position anteflexed, mobility: normal  Adnexa:     No adnexal tenderness present, no adnexal masses present  Perineum:     Perineum within normal limits, no evidence of trauma, no rashes or skin lesions present  Anus:     Anus within normal limits, no hemorrhoids present  Inguinal Lymph Nodes:     No lymphadenopathy present  Pubic Hair:     Normal pubic hair distribution for age  Genitalia and Groin:     No rashes present, no lesions present, no areas of discoloration, no masses present     In-Clinic Test Results:  No results found for this or any previous visit (from the past 24 hour(s)).    ASSESSMENT/PLAN:                                                        ICD-10-CM    1. PMDD (premenstrual dysphoric disorder) F32.81 norethindrone-ethinyl estradiol (MICROGESTIN 1.5/30) 1.5-30 MG-MCG per tablet   2. Encounter for IUD removal Z30.432        There are no Patient Instructions on file for this visit.    Discussed  pmdd, dysfunctional uterine bleeding, hormonal regulation of bleeding during menstral cycles  Time 20 minute, more than 50% counceling      Joan Santoyo MD  [facility]OM[facility] [place]

## 2017-05-17 NOTE — MR AVS SNAPSHOT
After Visit Summary   5/17/2017    Sarahi Gates    MRN: [id number]           Patient Information     Date Of Birth          1985        Visit Information        Provider Department      5/17/2017 3:40 PM Joan Santoyo MD [facility]        Today's Diagnoses     PMDD (premenstrual dysphoric disorder)    -  1    Encounter for IUD removal        Pain in joint, pelvic region and thigh, unspecified laterality           Follow-ups after your visit        Your next 10 appointments already scheduled     Aug 01, 2017  4:30 PM CDT   Office Visit with Joan Santoyo MD   [facility]    [address] [place] [id number] [phone]           Bring a current list of meds and any records pertaining to this visit.  For Physicals, please bring immunization records and any forms needing to be filled out.  Please arrive 10 minutes early to complete paperwork.              Who to contact     If you have questions or need follow up information about today's clinic visit or your schedule please contact [facility] directly at [phone].  Normal or non-critical lab and imaging results will be communicated to you by MyChart, letter or phone within 4 business days after the clinic has received the results. If you do not hear from us within 7 days, please contact the clinic through CleveXhart or phone. If you have a critical or abnormal lab result, we will notify you by phone as soon as possible.  Submit refill requests through [employer] or call your pharmacy and they will forward the refill request to us. Please allow 3 business days for your refill to be completed.          Additional Information About Your Visit        MyChart Information     [employer] gives you secure access to your electronic health record. If you see a primary care provider, you can also send messages to your care team and make  appointments. If you have questions, please call your primary care clinic.  If you do not have a primary care provider, please call [phone] and they will assist you.        Care EveryWhere ID     This is your Care EveryWhere ID. This could be used by other organizations to access your [place] medical records  [id number]        Your Vitals Were     BMI (Body Mass Index)                   22.27 kg/m2            Blood Pressure from Last 3 Encounters:   05/17/17 108/78   03/30/17 98/60   03/13/17 100/58    Weight from Last 3 Encounters:   05/17/17 138 lb (62.6 kg)   03/30/17 140 lb (63.5 kg)   03/13/17 145 lb 4.8 oz (65.9 kg)              We Performed the Following     REMOVE INTRAUTERINE DEVICE          Today's Medication Changes          These changes are accurate as of: 5/17/17  5:08 PM.  If you have any questions, ask your nurse or doctor.               Start taking these medicines.        Dose/Directions    norethindrone-ethinyl estradiol 1.5-30 MG-MCG per tablet   Commonly known as:  MICROGESTIN 1.5/30   Used for:  PMDD (premenstrual dysphoric disorder)   Started by:  Joan Santoyo MD        Take one tab daily continuous without placebo week, fill 4 pkg for 3 months   Quantity:  112 tablet   Refills:  4            Where to get your medicines      These medications were sent to [facility] [address] [address] AT [facility] [address] [address], [facility] [address]    Hours:  24-hours Phone:  [phone]     norethindrone-ethinyl estradiol 1.5-30 MG-MCG per tablet                Primary Care Provider Office Phone # Fax #    Bobby Styles [phone]       [place] [address]  [facility] [address]        Thank you!     Thank you for choosing [facility]  for your care. Our goal is always to provide you with excellent care. Hearing back from our patients is one way we can continue to  improve our services. Please take a few minutes to complete the written survey that you may receive in the mail after your visit with us. Thank you!             Your Updated Medication List - Protect others around you: Learn how to safely use, store and throw away your medicines at www.disposemymeds.org.          This list is accurate as of: 5/17/17  5:08 PM.  Always use your most recent med list.                   Brand Name Dispense Instructions for use    albuterol 108 (90 BASE) MCG/ACT Inhaler    albuterol    3 Inhaler    Inhale 1-2 puffs into the lungs every 4 hours as needed for shortness of breath / dyspnea       cetirizine 10 MG tablet    zyrTEC    30 tablet    Take 1 tablet by mouth every evening.       levonorgestrel 20 MCG/24HR IUD    MIRENA     1 each by Intrauterine route once       norethindrone-ethinyl estradiol 1.5-30 MG-MCG per tablet    MICROGESTIN 1.5/30    112 tablet    Take one tab daily continuous without placebo week, fill 4 pkg for 3 months       PRENATAL VITAMIN PO          sertraline 50 MG tablet    ZOLOFT    90 tablet    Take 1 tablet (50 mg) by mouth daily

## 2017-05-17 NOTE — PROGRESS NOTES
INDICATIONS:                                                      Is a pregnancy test required: No.  Was a consent obtained?  Yes    Sarahi Gates is a 32 year old female,, No LMP recorded. Patient is not currently having periods (Reason: IUD). who presents today for IUD removal. Her current IUD was placed 3/30/17 ago. She has had problems including bloating, cramps, nausea with the IUD. She requests removal of the IUD because of problems stated above    Today's PHQ-2 Score:   PHQ-2 (  [employer]) 10/26/2015   Q1: Little interest or pleasure in doing things 0   Q2: Feeling down, depressed or hopeless 0   PHQ-2 Score 0       PROCEDURE:                                                      A speculum exam was performed and the cervix was visualized. The IUD string was visualized. Using ring forceps, the string  was grasped and the IUD removed intact.    POST PROCEDURE:                                                      The patient tolerated the procedure well with minimal discomfort. Patient was discharged in stable condition.    Call if bleeding, pain or fever occur. and Birth control counseling given.    Joan Santoyo MD

## 2017-05-18 ASSESSMENT — ANXIETY QUESTIONNAIRES: GAD7 TOTAL SCORE: 3

## 2017-05-18 ASSESSMENT — PATIENT HEALTH QUESTIONNAIRE - PHQ9: SUM OF ALL RESPONSES TO PHQ QUESTIONS 1-9: 0

## 2017-08-01 ENCOUNTER — OFFICE VISIT (OUTPATIENT)
Dept: OBGYN | Facility: CLINIC | Age: 32
End: 2017-08-01
Payer: COMMERCIAL

## 2017-08-01 VITALS
WEIGHT: 139.2 LBS | SYSTOLIC BLOOD PRESSURE: 100 MMHG | DIASTOLIC BLOOD PRESSURE: 70 MMHG | BODY MASS INDEX: 22.37 KG/M2 | HEIGHT: 66 IN

## 2017-08-01 DIAGNOSIS — F41.9 ANXIETY: ICD-10-CM

## 2017-08-01 DIAGNOSIS — F32.81 PMDD (PREMENSTRUAL DYSPHORIC DISORDER): ICD-10-CM

## 2017-08-01 PROCEDURE — 99213 OFFICE O/P EST LOW 20 MIN: CPT | Performed by: OBSTETRICS & GYNECOLOGY

## 2017-08-01 RX ORDER — NORETHINDRONE ACETATE AND ETHINYL ESTRADIOL .03; 1.5 MG/1; MG/1
TABLET ORAL
Qty: 112 TABLET | Refills: 4 | Status: SHIPPED | OUTPATIENT
Start: 2017-08-01 | End: 2018-05-01

## 2017-08-01 NOTE — MR AVS SNAPSHOT
"              After Visit Summary   8/1/2017    Sarahi Gates    MRN: [id number]           Patient Information     Date Of Birth          1985        Visit Information        Provider Department      8/1/2017 4:30 PM Joan Santoyo MD [facility]        Today's Diagnoses     PMDD (premenstrual dysphoric disorder)        Anxiety           Follow-ups after your visit        Who to contact     If you have questions or need follow up information about today's clinic visit or your schedule please contact [facility] directly at [phone].  Normal or non-critical lab and imaging results will be communicated to you by MakeSpacehart, letter or phone within 4 business days after the clinic has received the results. If you do not hear from us within 7 days, please contact the clinic through MakeSpacehart or phone. If you have a critical or abnormal lab result, we will notify you by phone as soon as possible.  Submit refill requests through [employer] or call your pharmacy and they will forward the refill request to us. Please allow 3 business days for your refill to be completed.          Additional Information About Your Visit        MyChart Information     [employer] gives you secure access to your electronic health record. If you see a primary care provider, you can also send messages to your care team and make appointments. If you have questions, please call your primary care clinic.  If you do not have a primary care provider, please call [phone] and they will assist you.        Care EveryWhere ID     This is your Care EveryWhere ID. This could be used by other organizations to access your [place] medical records  [id number]        Your Vitals Were     Height Last Period BMI (Body Mass Index)             5' 6\" (1.676 m) (LMP Unknown) 22.47 kg/m2          Blood Pressure from Last 3 Encounters:   08/01/17 100/70   05/17/17 108/78   03/30/17 98/60    Weight from Last 3 Encounters: "   08/01/17 139 lb 3.2 oz (63.1 kg)   05/17/17 138 lb (62.6 kg)   03/30/17 140 lb (63.5 kg)              Today, you had the following     No orders found for display         Where to get your medicines      These medications were sent to [facility] [address] [facility] [address] [address], [facility] [address]    Hours:  24-hours Phone:  [phone]     norethindrone-ethinyl estradiol 1.5-30 MG-MCG per tablet    sertraline 50 MG tablet          Primary Care Provider Office Phone # Fax #    Bobby Styles [phone]       [place] [address] [place] [address]        Equal Access to Services     FREEDOM GRACE : Hadii jeffery canas hadasho Sokeyur, waaxda luqadaha, qaybta kaalmada adehernandezyada, kathleen sheppard . So [facility] [phone].    ATENCIÓN: Si habla español, tiene a muir disposición servicios gratuitos de asistencia lingüística. [place] al [phone].    We comply with applicable federal civil rights laws and [place] laws. We do not discriminate on the basis of race, color, national origin, age, disability sex, sexual orientation or gender identity.            Thank you!     Thank you for choosing [facility]  for your care. Our goal is always to provide you with excellent care. Hearing back from our patients is one way we can continue to improve our services. Please take a few minutes to complete the written survey that you may receive in the mail after your visit with us. Thank you!             Your Updated Medication List - Protect others around you: Learn how to safely use, store and throw away your medicines at www.disposemymeds.org.          This list is accurate as of: 8/1/17  9:02 PM.  Always use your most recent med list.                   Brand Name Dispense Instructions for use Diagnosis    albuterol 108 (90 BASE) MCG/ACT Inhaler    albuterol    3  Inhaler    Inhale 1-2 puffs into the lungs every 4 hours as needed for shortness of breath / dyspnea    Moderate persistent asthma       cetirizine 10 MG tablet    zyrTEC    30 tablet    Take 1 tablet by mouth every evening.    Supervision of normal first pregnancy       norethindrone-ethinyl estradiol 1.5-30 MG-MCG per tablet    MICROGESTIN 1.5/30    112 tablet    Take one tab daily continuous without placebo week, fill 4 pkg for 3 months    PMDD (premenstrual dysphoric disorder)       sertraline 50 MG tablet    ZOLOFT    90 tablet    Take 1 tablet (50 mg) by mouth daily    Anxiety

## 2017-08-01 NOTE — PROGRESS NOTES
SUBJECTIVE:                                                   Sarahi Gates is a 32 year old female who presents to clinic today for the following health issue(s):  Patient presents with:  Contraception: patient states doing better on ocp. some pms but manageable. taking pills continuously to skip periods.      HPI:  Patient started continuous ocp to help with her pms.  She thinks it is much better and says it is the best she has felt in ages.  Has occ breakthru bleeding but it is tolerable.  Patient has a high stress life due to her work as an .   We refilled her SSRI and her ocp today.  She thinks she is done having kids.      No LMP recorded (lmp unknown). Patient is not currently having periods (Reason: Birth Control)..   Patient is sexually active, .  Using oral contraceptives for contraception.    reports that she has never smoked. She has never used smokeless tobacco.      STD testing offered?  Declined    Health maintenance updated:  yes    Today's PHQ-2 Score:   PHQ-2 (  [employer]) 10/26/2015   Q1: Little interest or pleasure in doing things 0   Q2: Feeling down, depressed or hopeless 0   PHQ-2 Score 0     Today's PHQ-9 Score:   PHQ-9 SCORE 2017   Total Score -   Total Score 0     Today's RODGER-7 Score:   RODGER-7 SCORE 2017   Total Score 3   Total Score -       Problem list and histories reviewed & adjusted, as indicated.  Additional history: as documented.    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     PMDD (premenstrual dysphoric disorder)     Seasonal allergic rhinitis     Cervicalgia     Right-sided low back pain without sciatica     Encounter for supervision of other normal pregnancy, unspecified trimester     Prenatal  care, subsequent pregnancy     Family history of genetic disorder     Diet controlled gestational diabetes mellitus (GDM), antepartum     Encounter for triage in pregnant patient     Indication for care in labor or delivery     Vaginal delivery     Acute right-sided low back pain without sciatica     Thoracic segment dysfunction     Somatic dysfunction of sacral region     Family history of malignant melanoma of skin     Past Surgical History:   Procedure Laterality Date     DENTAL SURGERY  2003    wisdom teeth     HC AMNIOCENTESIS DIAGNOSTIC  12/7/2016      Social History   Substance Use Topics     Smoking status: Never Smoker     Smokeless tobacco: Never Used     Alcohol use 3.0 oz/week     5 Standard drinks or equivalent per week      Comment: socially - not during pregnancy      Problem (# of Occurrences) Relation (Name,Age of Onset)    Allergies (4) Paternal Grandfather, Paternal Grandmother, Father, Brother    Asthma (1) Paternal Grandfather    Breast Cancer (1) Other: maternal great-aunt    Hypertension (1) Mother: PIH    Thyroid Disease (1) Mother: both hypo and hyper?            Current Outpatient Prescriptions   Medication Sig     norethindrone-ethinyl estradiol (MICROGESTIN 1.5/30) 1.5-30 MG-MCG per tablet Take one tab daily continuous without placebo week, fill 4 pkg for 3 months     sertraline (ZOLOFT) 50 MG tablet Take 1 tablet (50 mg) by mouth daily     albuterol (ALBUTEROL) 108 (90 BASE) MCG/ACT inhaler Inhale 1-2 puffs into the lungs every 4 hours as needed for shortness of breath / dyspnea     cetirizine (ZYRTEC) 10 MG tablet Take 1 tablet by mouth every evening.     [DISCONTINUED] norethindrone-ethinyl estradiol (MICROGESTIN 1.5/30) 1.5-30 MG-MCG per tablet Take one tab daily continuous without placebo week, fill 4 pkg for 3 months     [DISCONTINUED] sertraline (ZOLOFT) 50 MG tablet Take 1 tablet (50 mg) by mouth daily     No current facility-administered medications for this visit.      No Known  "Allergies    ROS:  12 point review of systems negative other than symptoms noted below.    OBJECTIVE:     /70  Ht 5' 6\" (1.676 m)  Wt 139 lb 3.2 oz (63.1 kg)  LMP  (LMP Unknown)  BMI 22.47 kg/m2  Body mass index is 22.47 kg/(m^2).    Exam:  Constitutional:  Appearance: Well nourished, well developed alert, in no acute distress   Lungs- respirations unlabored  Skin- no visible rashes  Neuro- alert and oriented in no acute distress    In-Clinic Test Results:  No results found for this or any previous visit (from the past 24 hour(s)).    ASSESSMENT/PLAN:                                                        ICD-10-CM    1. PMDD (premenstrual dysphoric disorder) F32.81 norethindrone-ethinyl estradiol (MICROGESTIN 1.5/30) 1.5-30 MG-MCG per tablet   2. Anxiety F41.9 sertraline (ZOLOFT) 50 MG tablet       There are no Patient Instructions on file for this visit.    We discussed follow up of her PMDD and anxiety symptoms  Face to face time today was 15 minute, more than 50% counceling    Joan Santoyo MD  [facility]  "

## 2017-09-06 ENCOUNTER — THERAPY VISIT (OUTPATIENT)
Dept: CHIROPRACTIC MEDICINE | Facility: CLINIC | Age: 32
End: 2017-09-06
Payer: COMMERCIAL

## 2017-09-06 DIAGNOSIS — M25.551 HIP PAIN, RIGHT: ICD-10-CM

## 2017-09-06 DIAGNOSIS — M99.02 THORACIC SEGMENT DYSFUNCTION: ICD-10-CM

## 2017-09-06 DIAGNOSIS — M54.50 ACUTE RIGHT-SIDED LOW BACK PAIN WITHOUT SCIATICA: Primary | ICD-10-CM

## 2017-09-06 DIAGNOSIS — M99.04 SOMATIC DYSFUNCTION OF SACRAL REGION: ICD-10-CM

## 2017-09-06 DIAGNOSIS — M54.2 CERVICALGIA: ICD-10-CM

## 2017-09-06 PROCEDURE — 98941 CHIROPRACT MANJ 3-4 REGIONS: CPT | Mod: AT | Performed by: CHIROPRACTOR

## 2017-09-06 NOTE — MR AVS SNAPSHOT
After Visit Summary   9/6/2017    Sarahi Gates    MRN: [id number]           Patient Information     Date Of Birth          1985        Visit Information        Provider Department      9/6/2017 1:45 PM Connor Handy DC [place] [facility]        Today's Diagnoses     Acute right-sided low back pain without sciatica    -  1    Thoracic segment dysfunction        Somatic dysfunction of sacral region        Cervicalgia        Hip pain, right           Follow-ups after your visit        Who to contact     If you have questions or need follow up information about today's clinic visit or your schedule please contact [facility] [place] [facility] directly at [phone].  Normal or non-critical lab and imaging results will be communicated to you by Wool and the Ganghart, letter or phone within 4 business days after the clinic has received the results. If you do not hear from us within 7 days, please contact the clinic through [employer] or phone. If you have a critical or abnormal lab result, we will notify you by phone as soon as possible.  Submit refill requests through V[employer] or call your pharmacy and they will forward the refill request to us. Please allow 3 business days for your refill to be completed.          Additional Information About Your Visit        MyChart Information     V[employer] gives you secure access to your electronic health record. If you see a primary care provider, you can also send messages to your care team and make appointments. If you have questions, please call your primary care clinic.  If you do not have a primary care provider, please call [phone] and they will assist you.        Care EveryWhere ID     This is your Care EveryWhere ID. This could be used by other organizations to access your [place] medical records  [id number]         Blood Pressure from Last 3 Encounters:   08/01/17 100/70   05/17/17  108/78   03/30/17 98/60    Weight from Last 3 Encounters:   08/01/17 63.1 kg (139 lb 3.2 oz)   05/17/17 62.6 kg (138 lb)   03/30/17 63.5 kg (140 lb)              We Performed the Following     CHIROPRAC MANIP,SPINAL,3-4 REGIONS        Primary Care Provider Office Phone # Fax #    Bobby Styles [phone]       [address]  [facility] [address]        Equal Access to Services     [facility]: Hadii aad ku hadasho Soomaali, waaxda luqadaha, qaybta kaalmada adeegyada, waxay idiin hayaan adeeg kharavimal sheppard . So [facility] [phone].    ATENCIÓN: Si habla español, tiene a muir disposición servicios gratuitos de asistencia lingüística. [facility] [phone].    We comply with applicable federal civil rights laws and [place] laws. We do not discriminate on the basis of race, color, national origin, age, disability sex, sexual orientation or gender identity.            Thank you!     Thank you for choosing [facility]  [facility]  for your care. Our goal is always to provide you with excellent care. Hearing back from our patients is one way we can continue to improve our services. Please take a few minutes to complete the written survey that you may receive in the mail after your visit with us. Thank you!             Your Updated Medication List - Protect others around you: Learn how to safely use, store and throw away your medicines at www.disposemymeds.org.          This list is accurate as of: 9/6/17  2:54 PM.  Always use your most recent med list.                   Brand Name Dispense Instructions for use Diagnosis    albuterol 108 (90 BASE) MCG/ACT Inhaler    PROAIR HFA    3 Inhaler    Inhale 1-2 puffs into the lungs every 4 hours as needed for shortness of breath / dyspnea    Moderate persistent asthma       cetirizine 10 MG tablet    zyrTEC    30 tablet    Take 1 tablet by mouth every evening.    Supervision of normal first pregnancy        norethindrone-ethinyl estradiol 1.5-30 MG-MCG per tablet    MICROGESTIN 1.5/30    112 tablet    Take one tab daily continuous without placebo week, fill 4 pkg for 3 months    PMDD (premenstrual dysphoric disorder)       sertraline 50 MG tablet    ZOLOFT    90 tablet    Take 1 tablet (50 mg) by mouth daily    Anxiety

## 2017-09-06 NOTE — PROGRESS NOTES
Visit #:  2/2017    Subjective:  Sarahi Gates is a 31 year old female who is seen in f/u up for:        Acute right-sided low back pain without sciatica  Thoracic segment dysfunction  Somatic dysfunction of sacral region  Cervicalgia  Chronic right-sided low back pain without sciatica.     Since last visit on Visit,  Sarahi Gates reports: tightness in the R neck area and R low back. Sx seemed to have worsened over the past month. She has a new born and has been carrying the child often in addition to performing repetitious activities. She denies HA sx. She notes R sided hip pain  Without radiation into the extremities. The pt denies weakness in the extremities or other unusual sx.     Area of chief complaint:  Cervical and Thoracic :  Symptoms are graded at 5/10. The quality is described as stiff, dull.      P: pain elicited on palpation, cervical , thoracic and lumbar   A: static palpation demonstrates intersegmental asymmetry , C2, C2, C7, T1, L4, L5, SACRUM, R PSIS  R: motion palpation notes restricted motion  T: hypertonicity at: Levator scapulae and Quad lumb Bilaterally    Segmental spinal dysfunction/restrictions found at:  C2, C3, T5, T6, L4, L5, SACRUM, R PSIS.      Assessment:    Diagnoses:      1. Acute right-sided low back pain without sciatica    2. Thoracic segment dysfunction    3. Somatic dysfunction of sacral region    4. Cervicalgia    5. Chronic right-sided low back pain without sciatica        Patient's condition:  Exacerbation/regression    Treatment effectiveness:  No increase in sx, pt stable       Procedures:  CMT:  79194 Chiropractic manipulative treatment 3-4 regions performed   Cervical: Diversified, C2, C3 , C7 , Prone, Supine  Thoracic: Diversified, T5, T6, Prone  Lumbar: Diversified, L4, L5, Side posture  Pelvis: Drop Table, Sacrum , PSIS Right , Prone    Modalities:  None performed this visit    Therapeutic procedures:  Therapeutic in office stretch to cervical musculature for 2  minutes     Response to Treatment  Reduction of symptoms  No increase in sx pt stable     Prognosis: Excellent    Progress towards Goals: Pending    Recommendations:    Instructions:none     Follow-up:  Return to care in none.   Pt should FU is sx persist

## 2017-10-29 ENCOUNTER — HEALTH MAINTENANCE LETTER (OUTPATIENT)
Age: 32
End: 2017-10-29

## 2018-03-08 ENCOUNTER — VIRTUAL VISIT (OUTPATIENT)
Dept: FAMILY MEDICINE | Facility: OTHER | Age: 33
End: 2018-03-08

## 2018-03-08 NOTE — PROGRESS NOTES
"Date:   Clinician: Emory Wilson  Clinician NPI: [id number]  Patient: Sarahi Gates  Patient : 1985  Patient Address: [address] [place] [address]  Patient Phone: [phone]  Visit Protocol: URI  Patient Summary:  Sarahi is a 33 year old ( : 1985 ) female who initiated a Visit for cold, sinus infection, or influenza. When asked the question \"Please sign me up to receive news, health information and promotions. \", Sarahi responded \"No\".    Sarahi states her symptoms started gradually 2-3 weeks ago. After her symptoms started, they improved and then got worse again.   Her symptoms consist of malaise, a headache, rhinitis, a cough, nasal congestion, and facial pain or pressure.   Symptom details     Nasal secretions: The color of her mucus is green and yellow.    Cough: Sarahi coughs every 5-10 minutes and her cough is more bothersome at night. Phlegm comes into her throat when she coughs. She does not believe the phlegm causes the cough. The color of the phlegm is green and yellow.     Facial pain or pressure: The facial pain or pressure feels worse when bending over or leaning forward.     Headache: She states the headache is severe (between 7-9 on a 10 point pain scale).      Sarahi denies having wheezing, myalgias, sore throat, teeth pain, fever, dyspnea, ear pain, and chills. She also denies taking antibiotic medication for the symptoms, having recent facial or sinus surgery in the past 60 days, and having a sinus infection within the past year.   She has not recently been exposed to someone with influenza. Sarahi has been in close contact with the following high risk individuals: children under the age of 5.   Weight: 140 lbs   Sarahi does not smoke or use smokeless tobacco.   She denies pregnancy and denies breastfeeding. She does not menstruate.   MEDICATIONS:  Birth control pill, acetaminophen (Tylenol), ibuprofen (Advil, Motrin), guaifenesin (Robitussin, Mucinex), " cetirizine (Zyrtec), and phenylephrine (Sudafed PE)   Patient free text response: None  , ALLERGIES:  NKDA   Clinician Response:  Dear Sarahi,  Based on the information provided, you have acute bacterial sinusitis, also known as a sinus infection. Sinus infections are caused by bacteria or a virus and symptoms are almost always identical. The difference between the 2 types of infections is timing.  Sinus infections start as viral infections and symptoms improve on their own in about 7 days. If symptoms have not improved after 7 days or have even worsened, a bacterial infection may have developed.  Medication information  I am prescribing:     Amoxicillin 500 mg oral tablet. Take 1 tablet by mouth 3 times a day for 10 days. There are no refills with this prescription.   Antibiotics can cause an allergic reaction even if you have taken them without a problem in the past. If you develop a new rash, swelling, or difficulty breathing, stop the medication and be seen in a clinic or urgent care immediately.  A yeast infection is a side effect of taking antibiotics in some women. Please use [facility] to get treatment if you have symptoms of a yeast infection.  If you become pregnant during this course of treatment, stop taking the medication and contact your primary care provider.  Self care  The following tips will keep you as comfortable as possible while you recover:     Rest    Drink plenty of water and other liquids    Take a hot shower to loosen congestion    Take a spoonful of honey to reduce your cough     When to seek care  Please be seen in a clinic or urgent care if any of the following occur:     Symptoms do not start to improve after 3 days of treatment    New symptoms develop, or symptoms become worse      Diagnosis: Acute bacterial sinusitis  Diagnosis ICD: J01.90  Prescription: amoxicillin 500 mg oral tablet 30 tablets, 10 days supply. Take 1 tablet by mouth 3 times a day for 10 days. Refills: 0, Refill as  needed: no, Allow substitutions: yes  Pharmacy: [id number] - [phone] - [address] [address], [place] [address]

## 2018-05-01 ENCOUNTER — OFFICE VISIT (OUTPATIENT)
Dept: OBGYN | Facility: CLINIC | Age: 33
End: 2018-05-01
Payer: COMMERCIAL

## 2018-05-01 VITALS
SYSTOLIC BLOOD PRESSURE: 102 MMHG | WEIGHT: 143 LBS | BODY MASS INDEX: 22.98 KG/M2 | HEIGHT: 66 IN | DIASTOLIC BLOOD PRESSURE: 70 MMHG

## 2018-05-01 DIAGNOSIS — F32.81 PMDD (PREMENSTRUAL DYSPHORIC DISORDER): Primary | ICD-10-CM

## 2018-05-01 DIAGNOSIS — F41.9 ANXIETY: ICD-10-CM

## 2018-05-01 PROCEDURE — 99213 OFFICE O/P EST LOW 20 MIN: CPT | Performed by: OBSTETRICS & GYNECOLOGY

## 2018-05-01 RX ORDER — DROSPIRENONE AND ETHINYL ESTRADIOL 0.02-3(28)
1 KIT ORAL DAILY
Qty: 84 TABLET | Refills: 4 | Status: SHIPPED | OUTPATIENT
Start: 2018-05-01 | End: 2019-04-03 | Stop reason: DRUGHIGH

## 2018-05-01 NOTE — PROGRESS NOTES
SUBJECTIVE:                                                   Sarahi Gates is a 33 year old female who presents to clinic today for the following health issue(s):  Patient presents with:  Contraception: patient was having issues with her ocp. she would like to go back to [facility]. went off Microgesting a week and a half ago and has been feeling better. patient would also like a refill on her Zoloft      HPI:  Patient has pmdd  Feels that microgestin wasn't helping her anxiety  Wants to change back to agapito continuous without placebo week  Also wants to increase the zoloft to 75 mg daily   Periods heavy if she doesn't use ocp  2 kids, ,     No LMP recorded. Patient is not currently having periods (Reason: Birth Control)..   Patient is sexually active, .  Using none for contraception.    reports that she has never smoked. She has never used smokeless tobacco.    STD testing offered?  Declined    Health maintenance updated:  yes    Today's PHQ-2 Score:   PHQ-2 (  [employer]) 10/26/2015   Q1: Little interest or pleasure in doing things 0   Q2: Feeling down, depressed or hopeless 0   PHQ-2 Score 0     Today's PHQ-9 Score:   PHQ-9 SCORE 2017   Total Score -   Total Score 0     Today's RODGER-7 Score:   RODGER-7 SCORE 2017   Total Score 3   Total Score -       Problem list and histories reviewed & adjusted, as indicated.  Additional history: as documented.    Patient Active Problem List   Diagnosis     Hip pain     Moderate persistent asthma     Seasonal affective disorder (H)     Patellofemoral pain syndrome     Family history of carrier of genetic disease     Abnormal Pap smear     Gastroenteritis     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     PMDD (premenstrual dysphoric disorder)     Seasonal allergic rhinitis     Cervicalgia     Encounter for supervision of other normal pregnancy, unspecified trimester     Prenatal care, subsequent pregnancy     Family history of genetic disorder     Diet  controlled gestational diabetes mellitus (GDM), antepartum     Encounter for triage in pregnant patient     Indication for care in labor or delivery     Vaginal delivery     Acute right-sided low back pain without sciatica     Thoracic segment dysfunction     Somatic dysfunction of sacral region     Family history of malignant melanoma of skin     Hip pain, right     Past Surgical History:   Procedure Laterality Date     DENTAL SURGERY  2003    wisdom teeth     HC AMNIOCENTESIS DIAGNOSTIC  12/7/2016      Social History   Substance Use Topics     Smoking status: Never Smoker     Smokeless tobacco: Never Used     Alcohol use 3.0 oz/week     5 Standard drinks or equivalent per week      Comment: socially - not during pregnancy      Problem (# of Occurrences) Relation (Name,Age of Onset)    Allergies (4) Paternal Grandfather, Paternal Grandmother, Father, Brother    Asthma (1) Paternal Grandfather    Breast Cancer (1) Other: maternal great-aunt    Hypertension (1) Mother: PIH    Thyroid Disease (1) Mother: both hypo and hyper?            Current Outpatient Prescriptions   Medication Sig     albuterol (ALBUTEROL) 108 (90 BASE) MCG/ACT inhaler Inhale 1-2 puffs into the lungs every 4 hours as needed for shortness of breath / dyspnea     cetirizine (ZYRTEC) 10 MG tablet Take 1 tablet by mouth every evening.     drospirenone-ethinyl estradiol (PRANEETH) 3-0.02 MG per tablet Take 1 tablet by mouth daily Take continuous without placebo pills, take one active pill every day     sertraline (ZOLOFT) 50 MG tablet Take 1 1/2 tabs daily     [DISCONTINUED] sertraline (ZOLOFT) 50 MG tablet Take 1 tablet (50 mg) by mouth daily     No current facility-administered medications for this visit.      No Known Allergies    ROS:  12 point review of systems negative other than symptoms noted below.  Constitutional: Weight Gain  Gastrointestinal: Bloating  Endocrine: Decreased Libido  Psychiatric: Anxiety and Lopez    OBJECTIVE:     /70  Ht  "5' 6\" (1.676 m)  Wt 143 lb (64.9 kg)  BMI 23.08 kg/m2  Body mass index is 23.08 kg/(m^2).    Exam:  Constitutional:  Appearance: Well nourished, well developed alert, in no acute distress  Chest:  Respiratory Effort:  Breathing unlabored  Neurologic/Psychiatric:  Mental Status:  Oriented X3      In-Clinic Test Results:  No results found for this or any previous visit (from the past 24 hour(s)).    ASSESSMENT/PLAN:                                                        ICD-10-CM    1. PMDD (premenstrual dysphoric disorder) F32.81 drospirenone-ethinyl estradiol (PRANEETH) 3-0.02 MG per tablet     sertraline (ZOLOFT) 50 MG tablet   2. Anxiety F41.9        There are no Patient Instructions on file for this visit.    We discussed treatment of pmdd  Discussed how her symptoms have changed since last years  Discussed avoiding regular alcohol intake on SSRI  Return for annual exam      Joan Santoyo MD  [facility]  "

## 2018-05-01 NOTE — MR AVS SNAPSHOT
"              After Visit Summary   5/1/2018    Sarahi Gates    MRN: [id number]           Patient Information     Date Of Birth          1985        Visit Information        Provider Department      5/1/2018 3:10 PM Joan Santoyo MD [facility]        Today's Diagnoses     PMDD (premenstrual dysphoric disorder)    -  1    Anxiety           Follow-ups after your visit        Who to contact     If you have questions or need follow up information about today's clinic visit or your schedule please contact [facility] directly at [phone].  Normal or non-critical lab and imaging results will be communicated to you by [employer], letter or phone within 4 business days after the clinic has received the results. If you do not hear from us within 7 days, please contact the clinic through [employer] or phone. If you have a critical or abnormal lab result, we will notify you by phone as soon as possible.  Submit refill requests through [employer] or call your pharmacy and they will forward the refill request to us. Please allow 3 business days for your refill to be completed.          Additional Information About Your Visit        MyChart Information     [employer] gives you secure access to your electronic health record. If you see a primary care provider, you can also send messages to your care team and make appointments. If you have questions, please call your primary care clinic.  If you do not have a primary care provider, please call [phone] and they will assist you.        Care EveryWhere ID     This is your Care EveryWhere ID. This could be used by other organizations to access your [facility] medical records  [id number]        Your Vitals Were     Height BMI (Body Mass Index)                5' 6\" (1.676 m) 23.08 kg/m2           Blood Pressure from Last 3 Encounters:   05/01/18 102/70   08/01/17 100/70   05/17/17 108/78    Weight from Last 3 Encounters:   05/01/18 143 lb " (64.9 kg)   08/01/17 139 lb 3.2 oz (63.1 kg)   05/17/17 138 lb (62.6 kg)              Today, you had the following     No orders found for display         Today's Medication Changes          These changes are accurate as of 5/1/18  3:23 PM.  If you have any questions, ask your nurse or doctor.               Start taking these medicines.        Dose/Directions    drospirenone-ethinyl estradiol 3-0.02 MG per tablet   Commonly known as:  PRANEETH   Used for:  PMDD (premenstrual dysphoric disorder)   Started by:  Joan Santoyo MD        Dose:  1 tablet   Take 1 tablet by mouth daily Take continuous without placebo pills, take one active pill every day   Quantity:  84 tablet   Refills:  4         These medicines have changed or have updated prescriptions.        Dose/Directions    sertraline 50 MG tablet   Commonly known as:  ZOLOFT   This may have changed:    - how much to take  - how to take this  - when to take this  - additional instructions   Used for:  PMDD (premenstrual dysphoric disorder)   Changed by:  Joan Santoyo MD        Take 1 1/2 tabs daily   Quantity:  135 tablet   Refills:  3            Where to get your medicines      [facility] [address] [place] [address] AT [facility] [address] [address]    Hours:  24-hours Phone:  [phone]     drospirenone-ethinyl estradiol 3-0.02 MG per tablet    sertraline 50 MG tablet                Primary Care Provider Office Phone # Fax #    Bobby Styles [phone]       [address] [facility] [address]        Equal Access to Services     [facility]: Hadii jeffery Montoya, nevaeh ramos, qakathleen otero. So [facility] [phone].    ATENCIÓN: Si habla español, tiene a muir disposición servicios gratuitos de asistencia lingüística. Llame al [phone].    We comply with applicable  federal civil rights laws and [place] laws. We do not discriminate on the basis of race, color, national origin, age, disability, sex, sexual orientation, or gender identity.            Thank you!     Thank you for choosing [facility]  for your care. Our goal is always to provide you with excellent care. Hearing back from our patients is one way we can continue to improve our services. Please take a few minutes to complete the written survey that you may receive in the mail after your visit with us. Thank you!             Your Updated Medication List - Protect others around you: Learn how to safely use, store and throw away your medicines at www.disposemymeds.org.          This list is accurate as of 5/1/18  3:23 PM.  Always use your most recent med list.                   Brand Name Dispense Instructions for use Diagnosis    albuterol 108 (90 Base) MCG/ACT Inhaler    PROAIR HFA    3 Inhaler    Inhale 1-2 puffs into the lungs every 4 hours as needed for shortness of breath / dyspnea    Moderate persistent asthma       cetirizine 10 MG tablet    zyrTEC    30 tablet    Take 1 tablet by mouth every evening.    Supervision of normal first pregnancy       drospirenone-ethinyl estradiol 3-0.02 MG per tablet    PRANEETH    84 tablet    Take 1 tablet by mouth daily Take continuous without placebo pills, take one active pill every day    PMDD (premenstrual dysphoric disorder)       sertraline 50 MG tablet    ZOLOFT    135 tablet    Take 1 1/2 tabs daily    PMDD (premenstrual dysphoric disorder)

## 2018-06-15 NOTE — NURSING NOTE
"Chief Complaint   Patient presents with     IUD   would like Mirena  Refill Zoloft  Initial BP 98/60 (BP Location: Right arm, Cuff Size: Adult Regular)  Pulse 60  Wt 140 lb (63.5 kg)  LMP 05/06/2016  SpO2 99%  Breastfeeding? Yes  BMI 22.6 kg/m2 Estimated body mass index is 22.6 kg/(m^2) as calculated from the following:    Height as of 1/20/17: 5' 6\" (1.676 m).    Weight as of this encounter: 140 lb (63.5 kg).  Medication Reconciliation: complete   Leslie Levi MA      " Eyeglasses

## 2018-08-21 ENCOUNTER — THERAPY VISIT (OUTPATIENT)
Dept: CHIROPRACTIC MEDICINE | Facility: CLINIC | Age: 33
End: 2018-08-21
Payer: COMMERCIAL

## 2018-08-21 DIAGNOSIS — R51.9 CEPHALGIA: ICD-10-CM

## 2018-08-21 DIAGNOSIS — M54.2 CERVICALGIA: Primary | ICD-10-CM

## 2018-08-21 DIAGNOSIS — G89.29 CHRONIC BILATERAL LOW BACK PAIN WITHOUT SCIATICA: ICD-10-CM

## 2018-08-21 DIAGNOSIS — M99.04 SOMATIC DYSFUNCTION OF SACRAL REGION: ICD-10-CM

## 2018-08-21 DIAGNOSIS — M53.3 SACRAL PAIN: ICD-10-CM

## 2018-08-21 DIAGNOSIS — M99.01 CERVICAL SEGMENT DYSFUNCTION: ICD-10-CM

## 2018-08-21 DIAGNOSIS — M99.02 THORACIC SEGMENT DYSFUNCTION: ICD-10-CM

## 2018-08-21 DIAGNOSIS — M54.50 CHRONIC BILATERAL LOW BACK PAIN WITHOUT SCIATICA: ICD-10-CM

## 2018-08-21 PROBLEM — M25.551 HIP PAIN, RIGHT: Status: RESOLVED | Noted: 2017-09-06 | Resolved: 2018-08-21

## 2018-08-21 PROCEDURE — 98941 CHIROPRACT MANJ 3-4 REGIONS: CPT | Mod: AT | Performed by: CHIROPRACTOR

## 2018-08-21 PROCEDURE — 99213 OFFICE O/P EST LOW 20 MIN: CPT | Mod: 25 | Performed by: CHIROPRACTOR

## 2018-08-21 NOTE — PROGRESS NOTES
Chiropractic Clinic Visit    PCP: Bobby Styles    Sarahi Gates is a 33 year old female who is seen  as a self referral presenting with chronic neck pain and low back pain . Patient reports that the onset was over 10 years intermittently When asked, patient denies:, falling, slipping, bending and reaching or sleeping awkwardly. The pt reports an acute episode of neck pain and low back pain from many years of repetitious activities such as dancing. The pt also sits at a desk and performs computer activities. 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 She reports HA sx intermittently. Pain is located bilaterally more on the left side. Recently she was packing and unpacking her household and had a recent move. Sitting will increase the pain as will transitional movements. She grades the px a 2-6/10 on VAS. The pt denies weakness in the extremities or other unusual sx  Prior to onset, the patient was able to sit for extended hours. Patient notes that due to symptoms, they can only sit for a short period due to pain. Sarahi Gates notes   standing rated at a 6/10 painful and prior to this incident it was 1/10.        Injury: There was no injury related to this episode     Location of Pain: bilateral cervical and lumbar  at the following level(s) C2 , C3 , T1 , T5 , T9 , L4 , Sacrum  and PSIS Left   Duration of Pain: over 10 years-an acute episode   Rating of Pain at worst: 6/10  Rating of Pain Currently: 2/10  Symptoms are better with: Prior Chiropractic   Symptoms are worse with: sitting, standing and transitional movements  Additional Features: intermittent HA sx     Other evaluation and/or treatments so far consists of: Nothing    Health History  as reported by the patient:    How does the patient rate their own health:   Excellent      Current or past medical history:   Asthma    Medical allergies  None    Past Traumas/Surgeries  None    Family History  Family History   Problem Relation  Age of Onset     Asthma Paternal Grandfather      Allergies Paternal Grandfather      Allergies Paternal Grandmother      Allergies Father      Thyroid Disease Mother      both hypo and hyper?     Hypertension Mother      PIH     Allergies Brother      Breast Cancer Other      maternal great-aunt       Medications:  None    Occupation:       Primary job tasks:   Computer work, Prolonged sitting and Prolonged standing    Barriers as home/work:   none    Additional health Issues:     None               Review of Systems  Musculoskeletal: as above  Remainder of review of systems is negative including constitutional, CV, pulmonary, GI, Skin and Neurologic except as noted in HPI or medical history.    Past Medical History:   Diagnosis Date     Abnormal Pap smear     most recent normal     Asthma     excercise induced     Diabetes (H)     GESTATIONAL/DIET     Environmental allergies     using zyrtec     Mental disorder     on meds     Past Surgical History:   Procedure Laterality Date     DENTAL SURGERY  2003    wisdom teeth     HC AMNIOCENTESIS DIAGNOSTIC  12/7/2016       Objective  There were no vitals taken for this visit.    GENERAL APPEARANCE: healthy, alert and no distress   GAIT: NORMAL  SKIN: no suspicious lesions or rashes  NEURO: Normal strength and tone, mentation intact and speech normal  PSYCH:  mentation appears normal and affect normal/bright      Sarahi was asked to complete the Neck Disability Index, the Oswestry Low Back Disability Index and Arnold Start Back screening tool, today in the office. NDI Disability score: 14%; pain severity scale: 6/10., The Oswestry Disability score: 8%. Keel Start Total Score:2 Sub Score: 0     Cervical Spine Exam     Range of Motion:         Full active and passive ROM forward flexion, extension, lateral rotation, lateral flexion.     Inspection:         No visible deformity        normal lordotic curvature maintained     Tender:        paraspinal muscles       upper  "border of trapezius       B suboccipitals      Non-Tender:        remainder of cervical spine area     Strength:       C4 (shoulder shrug)  symmetric 5/5       C5 (shoulder abduction) symmetric 5/5       C6 (elbow flexion) symmetric 5/5       C7 (elbow extension) symmetric 5/5       C8 (finger abduction, thumb flexion) symmetric 5/5     Reflexes:        C5 (biceps) symmetric normal       C6 (supinator) symmetric normal       C7 (triceps) symmetric normal     Sensation:       grossly intact througout bilateral upper extremities     Special Tests:       positive (+) Spurling  Richard's- positive, VBI- negative and Juarez Xavier - negative     Lymphatics:        no edema noted in the upper extremities         Lumbar exam:     Inspection:  \"     no visible deformity in the low back       normal skin\",     ROM:       full flexion       limited extension due to pain     Tender:       paraspinal muscles       B QL      Non Tender:       remainder of lumbar spine     Strength:       hip flexion 5/5       knee extension 5/5       ankle dorsiflexion 5/5       ankle plantarflexion 5/5       dorsiflexion of the great toe 5/5     Reflexes:        Sensation:      grossly intact throughout lower extremities     Special tests:  Kemps - Right positive, low back pain and Left negative, SLR - Right negative and Left negative and Fabere - Right positive, hip and low back pain  and Left negative     Segmental spinal dysfunction/restrictions found at:  C2, C3, T5, T6, T9, L4, L5, R PSIS, sacrum.     The following soft tissue hypotonicities were observed:Quad lumb: bilateral, referred pain: no  Lev scapulae: ache and dull pain, referred pain: no  Sub-occiput: ache and dull pain, referred pain: no     Trigger points were found in:none      Muscle spasm found in:Lumbar erector spine, Quad lumb and T-spine paraspinal          Radiology:    Assessment:    1. Cervicalgia    2. Thoracic segment dysfunction    3. Somatic dysfunction of sacral region "    4. Chronic bilateral low back pain without sciatica    5. Sacral pain    6. Cephalgia    7. Cervical segment dysfunction        RX ordered/plan of care  Anticipated outcomes  Possible risks and side effects    After discussing the risk and benefits of care, patient consented to treatment    Prognosis: Good      Patient's condition:  Patient had restrictions pre-manipulation    Treatment effectiveness:  Post manipulation there is better intersegmental movement and Patient claims to feel looser post manipulation      Plan:    Procedures:  Evaluation and Management  21165 Moderate exam established patient 20 min    CMT:  18198 Chiropractic manipulative treatment 3-4 regions performed   Cervical: Diversified, C2, C7 , Prone, Supine  Thoracic: Diversified, T1, T7, T9, Prone  Lumbar: Diversified, L5, Side posture  Pelvis: Drop Table, Sacrum , PSIS Left , Prone    Modalities:  None performed this visit    Therapeutic procedures:  None    Response to Treatment  Reduction in symptoms as reported by patient    Treatment plan and goals:  Goals:  SLEEPING: the patient specific goal is to attain his pre-injury status of 8 hours comfortably  SITTING: the patient specific goal is to attain pre-injury status of  2-4 hours comfortably    Frequency of care  Duration of care is estimated to be 2-4 weeks, from the initial treatment.  It is estimated that the patient will need a total of 2-4 visits to resolve this episode.  For the initial therapeutic trial of care, the frequency is recommended at 1 X week, once daily.  A reevaluation would be clinically appropriate in 2-4 visits, to determine progress and further course of care.    In-Office Treatment  Evaluation  Spinal Chiropractic Manipulative Therapy:              Recommendations:    Instructions:none     Follow-up:    Return to care if sx persist .       Disclaimer: This note consists of symbols derived from keyboarding, dictation and/or voice recognition software. As a result,  there may be errors in the script that have gone undetected. Please consider this when interpreting information found in this chart.

## 2018-11-26 ENCOUNTER — THERAPY VISIT (OUTPATIENT)
Dept: CHIROPRACTIC MEDICINE | Facility: CLINIC | Age: 33
End: 2018-11-26
Payer: COMMERCIAL

## 2018-11-26 DIAGNOSIS — R51.9 CEPHALGIA: ICD-10-CM

## 2018-11-26 DIAGNOSIS — M54.50 CHRONIC BILATERAL LOW BACK PAIN WITHOUT SCIATICA: ICD-10-CM

## 2018-11-26 DIAGNOSIS — M99.04 SOMATIC DYSFUNCTION OF SACRAL REGION: ICD-10-CM

## 2018-11-26 DIAGNOSIS — M99.02 THORACIC SEGMENT DYSFUNCTION: ICD-10-CM

## 2018-11-26 DIAGNOSIS — M53.3 SACRAL PAIN: ICD-10-CM

## 2018-11-26 DIAGNOSIS — G89.29 CHRONIC BILATERAL LOW BACK PAIN WITHOUT SCIATICA: ICD-10-CM

## 2018-11-26 DIAGNOSIS — M99.01 CERVICAL SEGMENT DYSFUNCTION: Primary | ICD-10-CM

## 2018-11-26 PROCEDURE — 98941 CHIROPRACT MANJ 3-4 REGIONS: CPT | Mod: AT | Performed by: CHIROPRACTOR

## 2018-11-26 NOTE — PROGRESS NOTES
Visit #:  2/2018    Subjective:  Sarahi Gates is a 31 year old female who is seen in f/u up for:        Chronic bilateral low back pain without sciatica  Sacral pain  Somatic dysfunction of sacral region  Thoracic segment dysfunction  Cephalgia  Cervical segment dysfunction.     Since last visit on Visit,  Sarahi Gates reports: tightness in the posterior neck area and low back. She denies a specific incident that could have caused the px. She feels it is due to sitting for extended periods and lifting her child on a repetitious basis. The pt denies HA sx. She denies weakness or other unusual sx.     Area of chief complaint:  Cervical and Thoracic :  Symptoms are graded at 4/10. The quality is described as stiff, dull.      P: pain elicited on palpation, cervical , thoracic and lumbar   A: static palpation demonstrates intersegmental asymmetry , C2, C2, C7, T1, L4, L5, SACRUM, R PSIS  R: motion palpation notes restricted motion  T: hypertonicity at: Levator scapulae and Quad lumb Bilaterally    Segmental spinal dysfunction/restrictions found at:  C2, C3, T5, T6, L4, L5, SACRUM, R PSIS.      Assessment:    Diagnoses:      1. Cervical segment dysfunction    2. Chronic bilateral low back pain without sciatica    3. Sacral pain    4. Somatic dysfunction of sacral region    5. Thoracic segment dysfunction    6. Cephalgia        Patient's condition:  Exacerbation/regression    Treatment effectiveness:  No increase in sx, pt stable       Procedures:  CMT:  64266 Chiropractic manipulative treatment 3-4 regions performed   Cervical: Diversified, C2, C3 , C7 , Prone, Supine  Thoracic: Diversified, T5, T6, Prone  Lumbar: Diversified, L4, L5, Side posture  Pelvis: Drop Table, Sacrum , PSIS Right , Prone    Modalities:  None performed this visit    Therapeutic procedures:  Therapeutic in office stretch to cervical musculature for 2 minutes     Response to Treatment  Reduction of symptoms  No increase in sx pt stable      Prognosis: Excellent    Progress towards Goals: Pending    Recommendations:    Instructions:none     Follow-up:  Return to care if sx persist

## 2019-02-08 ENCOUNTER — MYC MEDICAL ADVICE (OUTPATIENT)
Dept: OBGYN | Facility: CLINIC | Age: 34
End: 2019-02-08

## 2019-02-12 NOTE — TELEPHONE ENCOUNTER
2/8/19 Dr. Santoyo - last May we switched my birth control from Microgestin (taken continuously) to Mildred (to be taken continuously) because the microgestin was not working well. I was unable to take Mildred continuously because it did nothing. I switched after two months to taking it with the placebos, but it was not strong enough to control any aspect of my cycle. I think I've given it a good shot, but need to switch to a stronger pill and just have a monthly period and deal with it. Would it be a good idea to try the Microgestin again, but take it with the placebos?    2/12/19:  Hi - I m following up on my message last week. I m hoping to switch to microgratin until May when I have my annual exam because I thought that might be a good trial run. Would it be possible to get the medication switched and the prescription sent to my pharmacy?    Routing pt request via [employer] to Dr Santoyo

## 2019-02-14 RX ORDER — NORETHINDRONE ACETATE AND ETHINYL ESTRADIOL .03; 1.5 MG/1; MG/1
1 TABLET ORAL DAILY
Qty: 84 TABLET | Refills: 2 | Status: SHIPPED | OUTPATIENT
Start: 2019-02-14 | End: 2019-04-03

## 2019-02-14 NOTE — TELEPHONE ENCOUNTER
Go ahead and send prescription for microgestin 1.5/30 84 tabs 2 refills   Dr. ALEXANDRE Santoyo    Rx sent to [facility] as written by Dr Santoyo above. [employer] message sent informing pt.

## 2019-03-11 ENCOUNTER — THERAPY VISIT (OUTPATIENT)
Dept: CHIROPRACTIC MEDICINE | Facility: CLINIC | Age: 34
End: 2019-03-11
Payer: COMMERCIAL

## 2019-03-11 DIAGNOSIS — M53.3 SACRAL PAIN: ICD-10-CM

## 2019-03-11 DIAGNOSIS — M99.02 THORACIC SEGMENT DYSFUNCTION: ICD-10-CM

## 2019-03-11 DIAGNOSIS — R51.9 CEPHALGIA: ICD-10-CM

## 2019-03-11 DIAGNOSIS — M54.50 CHRONIC BILATERAL LOW BACK PAIN WITHOUT SCIATICA: ICD-10-CM

## 2019-03-11 DIAGNOSIS — M99.01 CERVICAL SEGMENT DYSFUNCTION: ICD-10-CM

## 2019-03-11 DIAGNOSIS — M99.04 SOMATIC DYSFUNCTION OF SACRAL REGION: Primary | ICD-10-CM

## 2019-03-11 DIAGNOSIS — G89.29 CHRONIC BILATERAL LOW BACK PAIN WITHOUT SCIATICA: ICD-10-CM

## 2019-03-11 PROCEDURE — 98941 CHIROPRACT MANJ 3-4 REGIONS: CPT | Mod: AT | Performed by: CHIROPRACTOR

## 2019-03-11 NOTE — PROGRESS NOTES
Visit #:  1/2019    Subjective:  Sarahi Gates is a 31 year old female who is seen in f/u up for:        Somatic dysfunction of sacral region  Chronic bilateral low back pain without sciatica  Cervical segment dysfunction  Sacral pain  Thoracic segment dysfunction  Cephalgia.     Since last visit on Visit,  Sarahi Gates reports:increased HA sx and tension in the low back area from running and sitting at a computer for prolonged periods. She denies other unusual sx. Overall the pt has been performing stretching exercises to aid restriction in the spine and pelvis.     Area of chief complaint:  Cervical and Thoracic and lumbar :  Symptoms are graded at 3/10. The quality is described as stiff, dull.      P: pain elicited on palpation, cervical , thoracic and lumbar   A: static palpation demonstrates intersegmental asymmetry , C2, C2, C7, T1, L4, L5, SACRUM, R PSIS  R: motion palpation notes restricted motion  T: hypertonicity at: Levator scapulae and Quad lumb Bilaterally    Segmental spinal dysfunction/restrictions found at:  C2, C3, T5, T6, L4, L5, SACRUM, R PSIS.      Assessment:    Diagnoses:      1. Somatic dysfunction of sacral region    2. Chronic bilateral low back pain without sciatica    3. Cervical segment dysfunction    4. Sacral pain    5. Thoracic segment dysfunction    6. Cephalgia        Patient's condition:  Exacerbation/regression    Treatment effectiveness:  No increase in sx, pt stable       Procedures:  CMT:  25271 Chiropractic manipulative treatment 3-4 regions performed   Cervical: Diversified, C2, C3 , C7 , Prone, Supine  Thoracic: Diversified, T5, T6, Prone  Lumbar: Diversified, L4, L5, Side posture  Pelvis: Drop Table, Sacrum , PSIS Right , Prone    Modalities:  None performed this visit    Therapeutic procedures:  Therapeutic in office stretch to cervical musculature for 2 minutes     Response to Treatment  Reduction of symptoms  No increase in sx pt stable     Prognosis:  Excellent    Progress towards Goals: Pending    Recommendations:    Instructions:none     Follow-up:  Return to care if sx persist

## 2019-04-03 ENCOUNTER — OFFICE VISIT (OUTPATIENT)
Dept: OBGYN | Facility: CLINIC | Age: 34
End: 2019-04-03
Payer: COMMERCIAL

## 2019-04-03 VITALS
BODY MASS INDEX: 23.63 KG/M2 | DIASTOLIC BLOOD PRESSURE: 60 MMHG | SYSTOLIC BLOOD PRESSURE: 98 MMHG | WEIGHT: 147 LBS | HEIGHT: 66 IN

## 2019-04-03 DIAGNOSIS — F32.81 PMDD (PREMENSTRUAL DYSPHORIC DISORDER): ICD-10-CM

## 2019-04-03 DIAGNOSIS — Z01.419 ENCOUNTER FOR GYNECOLOGICAL EXAMINATION WITHOUT ABNORMAL FINDING: Primary | ICD-10-CM

## 2019-04-03 PROCEDURE — G0145 SCR C/V CYTO,THINLAYER,RESCR: HCPCS | Performed by: OBSTETRICS & GYNECOLOGY

## 2019-04-03 PROCEDURE — 99214 OFFICE O/P EST MOD 30 MIN: CPT | Mod: 25 | Performed by: OBSTETRICS & GYNECOLOGY

## 2019-04-03 PROCEDURE — 87624 HPV HI-RISK TYP POOLED RSLT: CPT | Performed by: OBSTETRICS & GYNECOLOGY

## 2019-04-03 PROCEDURE — 99395 PREV VISIT EST AGE 18-39: CPT | Performed by: OBSTETRICS & GYNECOLOGY

## 2019-04-03 RX ORDER — NORETHINDRONE ACETATE AND ETHINYL ESTRADIOL .03; 1.5 MG/1; MG/1
1 TABLET ORAL DAILY
Qty: 112 TABLET | Refills: 4 | Status: SHIPPED | OUTPATIENT
Start: 2019-04-03 | End: 2020-05-06

## 2019-04-03 ASSESSMENT — ANXIETY QUESTIONNAIRES
GAD7 TOTAL SCORE: 2
7. FEELING AFRAID AS IF SOMETHING AWFUL MIGHT HAPPEN: NOT AT ALL
5. BEING SO RESTLESS THAT IT IS HARD TO SIT STILL: NOT AT ALL
IF YOU CHECKED OFF ANY PROBLEMS ON THIS QUESTIONNAIRE, HOW DIFFICULT HAVE THESE PROBLEMS MADE IT FOR YOU TO DO YOUR WORK, TAKE CARE OF THINGS AT HOME, OR GET ALONG WITH OTHER PEOPLE: NOT DIFFICULT AT ALL
6. BECOMING EASILY ANNOYED OR IRRITABLE: SEVERAL DAYS
1. FEELING NERVOUS, ANXIOUS, OR ON EDGE: SEVERAL DAYS
2. NOT BEING ABLE TO STOP OR CONTROL WORRYING: NOT AT ALL
3. WORRYING TOO MUCH ABOUT DIFFERENT THINGS: NOT AT ALL

## 2019-04-03 ASSESSMENT — MIFFLIN-ST. JEOR: SCORE: 1393.54

## 2019-04-03 ASSESSMENT — PATIENT HEALTH QUESTIONNAIRE - PHQ9
5. POOR APPETITE OR OVEREATING: NOT AT ALL
SUM OF ALL RESPONSES TO PHQ QUESTIONS 1-9: 0

## 2019-04-03 NOTE — PROGRESS NOTES
Sarahi is a 34 year old  female who presents for annual exam.     Besides routine health maintenance,  she would like to discuss  Her PMDD.    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  Her current contraception method is: oral contraceptives.  She  reports that  has never smoked. she has never used smokeless tobacco.  Patient is sexually active.  STD testing offered?  Declined     Last PHQ-9 score on record =   PHQ-9 SCORE 4/3/2019   PHQ-9 Total Score -   PHQ-9 Total Score 0     Last GAD7 score on record =   RODGER-7 SCORE 4/3/2019   Total Score 2   Total Score -     Alcohol Score = 4    HEALTH MAINTENANCE:  Cholesterol:   Cholesterol   Date Value Ref Range Status   2014 163 <200 mg/dL Final     Comment:     LDL Cholesterol is the primary guide to therapy.   The NCEP recommends further evaluation of: patients with cholesterol greater   than 200 mg/dL if additional risk factors are present, cholesterol greater   than   240 mg/dL, triglycerides greater than 150 mg/dL, or HDL less than 40 mg/dL.     2011 211 (H) 0 - 200 mg/dL Final     Comment:     LDL Cholesterol is the primary guide to therapy.   The NCEP recommends further evaluation of: patients with cholesterol <200   mg/dL   if additional  risk factors are present, cholesterol >240 mg/dL, triglycerides   >150 mg/dL, or HDL <40 mg/dL.   Last Mammo: one year ago, Result: not applicable, Next Mammo: screen age 40  Pap:   Lab Results   Component Value Date    PAP NIL 2016    PAP NIL 12/10/2012    PAP NIL 2011   Colonoscopy:  never, Result: not applicable, Next Colonoscopy: screen age 50  Dexa:  Not applicable  Health maintenance updated:  yes    HISTORY:  Obstetric History       T2      L2     SAB0   TAB0   Ectopic0   Multiple0   Live Births2       # Outcome Date GA Lbr Jose Antonio/2nd Weight Sex Delivery Anes PTL Lv   2 Term 17 39w4d 01:50 / 00:23 3.55 kg (7 lb 13.2 oz) F Vag-Spont EPI N RADHA      Name: BARRY HOPKINS      Apgar1:  8                Apgar5: 9   1 Term 13 38w4d 07:50 / 02:23 3.54 kg (7 lb 12.9 oz) M Vag-Spont EPI N RADHA      Name: Tyler      Apgar1:  9                Apgar5: 9          Patient Active Problem List   Diagnosis     Hip pain     Moderate persistent asthma     Seasonal affective disorder (H)     Patellofemoral pain syndrome     Family history of carrier of genetic disease     Abnormal Pap smear     Gastroenteritis     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     PMDD (premenstrual dysphoric disorder)     Seasonal allergic rhinitis     Encounter for supervision of other normal pregnancy, unspecified trimester     Prenatal care, subsequent pregnancy     Family history of genetic disorder     Diet controlled gestational diabetes mellitus (GDM), antepartum     Encounter for triage in pregnant patient     Indication for care in labor or delivery     Vaginal delivery     Family history of malignant melanoma of skin     Chronic bilateral low back pain without sciatica     Sacral pain     Somatic dysfunction of sacral region     Thoracic segment dysfunction     Cephalgia     Cervical segment dysfunction     Past Surgical History:   Procedure Laterality Date     DENTAL SURGERY      [place]  "teeth     [facility] AMNIOCENTESIS DIAGNOSTIC  12/7/2016      Social History     Tobacco Use     Smoking status: Never Smoker     Smokeless tobacco: Never Used   Substance Use Topics     Alcohol use: Yes     Alcohol/week: 3.0 oz     Types: 5 Standard drinks or equivalent per week     Comment: socially - not during pregnancy      Problem (# of Occurrences) Relation (Name,Age of Onset)    Allergies (4) Paternal Grandfather, Paternal Grandmother, Father, Brother    Asthma (1) Paternal Grandfather    Breast Cancer (1) Other: maternal great-aunt    Hypertension (1) Mother: PIH    Thyroid Disease (1) Mother: both hypo and hyper?            Current Outpatient Medications   Medication Sig     albuterol (ALBUTEROL) 108 (90 BASE) MCG/ACT inhaler Inhale 1-2 puffs into the lungs every 4 hours as needed for shortness of breath / dyspnea     cetirizine (ZYRTEC) 10 MG tablet Take 1 tablet by mouth every evening.     norethindrone-ethinyl estradiol (MICROGESTIN 1.5/30) 1.5-30 MG-MCG tablet Take 1 tablet by mouth daily ,take continuous without placebo week, fill 4pkg for 3 mo     sertraline (ZOLOFT) 50 MG tablet Take 1 1/2 tabs daily     No current facility-administered medications for this visit.      No Known Allergies    Past medical, surgical, social and family histories were reviewed and updated in EPIC.    ROS:   12 point review of systems negative other than symptoms noted below.    EXAM:  BP 98/60   Ht 1.676 m (5' 6\")   Wt 66.7 kg (147 lb)   LMP 03/31/2019   BMI 23.73 kg/m     BMI: Body mass index is 23.73 kg/m .    PHYSICAL EXAM:  Constitutional:  Appearance: Well nourished, well developed, alert, in no acute distress  Neck:  Lymph Nodes:  No lymphadenopathy present    Thyroid:  Gland size normal, nontender, no nodules or masses present  on palpation  Chest:  Respiratory Effort:  Breathing unlabored  Cardiovascular:    Heart: Auscultation:  Regular rate, normal rhythm, no murmurs present  Breasts: Inspection of Breasts:  No " lymphadenopathy present., Palpation of Breasts and Axillae:  No masses present on palpation, no breast tenderness., Axillary Lymph Nodes:  No lymphadenopathy present. and No nodularity, asymmetry or nipple discharge bilaterally.  Gastrointestinal:   Abdominal Examination:  Abdomen nontender to palpation, tone normal without rigidity or guarding, no masses present, umbilicus without lesions   Liver and Spleen:  No hepatomegaly present, liver nontender to palpation    Hernias:  No hernias present  Lymphatic: Lymph Nodes:  No other lymphadenopathy present  Skin:  General Inspection:  No rashes present, no lesions present, no areas of  discoloration    Genitalia and Groin:  No rashes present, no lesions present, no areas of  discoloration, no masses present  Neurologic/Psychiatric:    Mental Status:  Oriented X3     Pelvic Exam:  External Genitalia:     Normal appearance for age, no discharge present, no tenderness present, no inflammatory lesions present, color normal  Vagina:     Normal vaginal vault without central or paravaginal defects, no discharge present, no inflammatory lesions present, no masses present  Bladder:     Nontender to palpation  Urethra:   Urethral Body:  Urethra palpation normal, urethra structural support normal   Urethral Meatus:  No erythema or lesions present  Cervix:     Appearance healthy, no lesions present, nontender to palpation, no bleeding present  Uterus:     Uterus: firm, normal sized and nontender, midplane in position.   Adnexa:     No adnexal tenderness present, no adnexal masses present  Perineum:     Perineum within normal limits, no evidence of trauma, no rashes or skin lesions present  Anus:     Anus within normal limits, no hemorrhoids present  Inguinal Lymph Nodes:     No lymphadenopathy present  Pubic Hair:     Normal pubic hair distribution for age  Genitalia and Groin:     No rashes present, no lesions present, no areas of discoloration, no masses present      COUNSELING:    Reviewed preventive health counseling, as reflected in patient instructions       PMDD    BMI: Body mass index is 23.73 kg/m .      ASSESSMENT:  34 year old female with satisfactory annual exam.    ICD-10-CM    1. Contraception Z30.9    2. PMDD (premenstrual dysphoric disorder) F32.81 norethindrone-ethinyl estradiol (MICROGESTIN 1.5/30) 1.5-30 MG-MCG tablet     sertraline (ZOLOFT) 50 MG tablet       PLAN:  We reviewed last several years of her various regimens to try to reduce her PMDD and period issues    Rec she stick with the loestrin 1.5/30 since she had best control of cycles with that.  She can either cycle it or take continuous.  Keep diary the next year of symptoms and meds so we can see if there is any specific trigger.     I am not certain that her mood symptoms are really from PMDD rather than significant anxiety disorder.  She has not seen psychiatry so that would be an option.  We aren't going to change the zoloft dose or med.    She could try cycle pills but shorter break like 3 days rather than 7.     Reviewed her prior records in detail  Pap and hpv done  Additional 25 minute spent on E/M services relating to her PMDD and mood concerns beyond her preventive services for today.   More than 50% counceling and coordination of care.     Joan Santoyo MD

## 2019-04-04 ASSESSMENT — ANXIETY QUESTIONNAIRES: GAD7 TOTAL SCORE: 2

## 2019-04-08 LAB
COPATH REPORT: NORMAL
PAP: NORMAL

## 2019-04-09 LAB
FINAL DIAGNOSIS: NORMAL
HPV HR 12 DNA CVX QL NAA+PROBE: NEGATIVE
HPV16 DNA SPEC QL NAA+PROBE: NEGATIVE
HPV18 DNA SPEC QL NAA+PROBE: NEGATIVE
SPECIMEN DESCRIPTION: NORMAL
SPECIMEN SOURCE CVX/VAG CYTO: NORMAL

## 2019-05-28 ENCOUNTER — THERAPY VISIT (OUTPATIENT)
Dept: CHIROPRACTIC MEDICINE | Facility: CLINIC | Age: 34
End: 2019-05-28
Payer: COMMERCIAL

## 2019-05-28 DIAGNOSIS — M99.04 SOMATIC DYSFUNCTION OF SACRAL REGION: Primary | ICD-10-CM

## 2019-05-28 DIAGNOSIS — M53.3 SACRAL PAIN: ICD-10-CM

## 2019-05-28 DIAGNOSIS — M99.02 THORACIC SEGMENT DYSFUNCTION: ICD-10-CM

## 2019-05-28 DIAGNOSIS — M54.50 CHRONIC BILATERAL LOW BACK PAIN WITHOUT SCIATICA: ICD-10-CM

## 2019-05-28 DIAGNOSIS — G89.29 CHRONIC BILATERAL LOW BACK PAIN WITHOUT SCIATICA: ICD-10-CM

## 2019-05-28 DIAGNOSIS — R51.9 CEPHALGIA: ICD-10-CM

## 2019-05-28 DIAGNOSIS — M99.01 CERVICAL SEGMENT DYSFUNCTION: ICD-10-CM

## 2019-05-28 PROCEDURE — 98941 CHIROPRACT MANJ 3-4 REGIONS: CPT | Mod: AT | Performed by: CHIROPRACTOR

## 2019-05-28 NOTE — PROGRESS NOTES
Visit #:  2/2019    Subjective:  Sarahi Gates is a 31 year old female who is seen in f/u up for:        Somatic dysfunction of sacral region  Chronic bilateral low back pain without sciatica  Cervical segment dysfunction  Sacral pain  Thoracic segment dysfunction  Cephalgia.     Since last visit on Visit,  Sarahi Gates reports: a slight increase in R sided neck sx in addition to HA sx. She notes tension in the low back area and L hip. She has been sitting at the computer for extended periods. She denies radiation of pain in the extremities. She denies weakness in the extremities or other unusual sx.       Area of chief complaint:  Cervical and Thoracic and lumbar :  Symptoms are graded at 2-3/10. The quality is described as stiff, dull.      P: pain elicited on palpation, cervical , thoracic and lumbar   A: static palpation demonstrates intersegmental asymmetry : C2, C7, T1, T6 , L5R SACRUM, L PSIS  R: motion palpation notes restricted motion  T: hypertonicity at: Levator scapulae and Quad lumb Bilaterally    Segmental spinal dysfunction/restrictions found at:  C2, C7,  T1, T6  , L5R SACRUM, L PSIS.      Assessment:    Diagnoses:      1. Somatic dysfunction of sacral region    2. Chronic bilateral low back pain without sciatica    3. Cervical segment dysfunction    4. Sacral pain    5. Thoracic segment dysfunction    6. Cephalgia        Patient's condition:  Exacerbation/regression    Treatment effectiveness:  No increase in sx, pt stable       Procedures:  CMT:  37326 Chiropractic manipulative treatment 3-4 regions performed   Cervical: Diversified, C2,  C7 , Prone, Supine  Thoracic: Diversified, T1, T6, Prone supine  Lumbar: Diversified, L5, Side posture  Pelvis: Drop Table, Sacrum , PSIS Left , Prone    Modalities:  None performed this visit    Therapeutic procedures:  Therapeutic in office stretch to cervical musculature for 2 minutes     Response to Treatment  Reduction of symptoms  No increase in sx pt  stable     Prognosis: Excellent    Progress towards Goals: Pending    Recommendations:    Instructions:none     Follow-up:  Return to care if sx persist

## 2019-08-13 ENCOUNTER — THERAPY VISIT (OUTPATIENT)
Dept: CHIROPRACTIC MEDICINE | Facility: CLINIC | Age: 34
End: 2019-08-13
Payer: COMMERCIAL

## 2019-08-13 DIAGNOSIS — M53.3 SACRAL PAIN: ICD-10-CM

## 2019-08-13 DIAGNOSIS — M99.04 SOMATIC DYSFUNCTION OF SACRAL REGION: Primary | ICD-10-CM

## 2019-08-13 DIAGNOSIS — M99.01 CERVICAL SEGMENT DYSFUNCTION: ICD-10-CM

## 2019-08-13 DIAGNOSIS — M99.02 THORACIC SEGMENT DYSFUNCTION: ICD-10-CM

## 2019-08-13 DIAGNOSIS — M54.50 CHRONIC BILATERAL LOW BACK PAIN WITHOUT SCIATICA: ICD-10-CM

## 2019-08-13 DIAGNOSIS — G89.29 CHRONIC BILATERAL LOW BACK PAIN WITHOUT SCIATICA: ICD-10-CM

## 2019-08-13 PROCEDURE — 98941 CHIROPRACT MANJ 3-4 REGIONS: CPT | Mod: AT | Performed by: CHIROPRACTOR

## 2019-08-13 NOTE — PROGRESS NOTES
Visit #:  3/2019    Subjective:  Sarahi Gates is a 31 year old female who is seen in f/u up for:        Somatic dysfunction of sacral region  Sacral pain  Thoracic segment dysfunction  Chronic bilateral low back pain without sciatica  Cervical segment dysfunction.     Since last visit on Visit,  Sarahi Gates reports: a slight increase B cervical pain and low back tension. She has been training for and running for a race and notes increased pain levels. She denies HA sx. The pt denies radiation of pain in the extremities.     Area of chief complaint:  Cervical and Thoracic and lumbar :  Symptoms are graded at 3/10. The quality is described as stiff, dull.      P: pain elicited on palpation, cervical , thoracic and lumbar   A: static palpation demonstrates intersegmental asymmetry : C2, C7, T1, T6 , L5R SACRUM, L PSIS  R: motion palpation notes restricted motion  T: hypertonicity at: Levator scapulae and Quad lumb Bilaterally    Segmental spinal dysfunction/restrictions found at:  C2, C7,  T1, T6  , L5R SACRUM, L PSIS.      Assessment:    Diagnoses:      1. Somatic dysfunction of sacral region    2. Sacral pain    3. Thoracic segment dysfunction    4. Chronic bilateral low back pain without sciatica    5. Cervical segment dysfunction        Patient's condition:  Exacerbation/regression    Treatment effectiveness:  No increase in sx, pt stable       Procedures:  CMT:  76630 Chiropractic manipulative treatment 3-4 regions performed   Cervical: Diversified, C2,  C7 , Prone, Supine  Thoracic: Diversified, T1, T6, Prone supine  Lumbar: Diversified, L5, Side posture  Pelvis: Drop Table, Sacrum , PSIS Left , Prone    Modalities:  None performed this visit    Therapeutic procedures:  Therapeutic in office stretch to cervical musculature for 2 minutes     Response to Treatment  Reduction of symptoms  No increase in sx pt stable     Prognosis: Excellent    Progress towards Goals:  Pending    Recommendations:    Instructions:none     Follow-up:  Return to care if sx persist

## 2019-11-11 ENCOUNTER — MYC REFILL (OUTPATIENT)
Dept: OBGYN | Facility: CLINIC | Age: 34
End: 2019-11-11

## 2019-11-11 DIAGNOSIS — F32.81 PMDD (PREMENSTRUAL DYSPHORIC DISORDER): ICD-10-CM

## 2019-11-11 RX ORDER — NORETHINDRONE ACETATE AND ETHINYL ESTRADIOL .03; 1.5 MG/1; MG/1
1 TABLET ORAL DAILY
Qty: 112 TABLET | Refills: 4 | OUTPATIENT
Start: 2019-11-11

## 2019-11-11 NOTE — TELEPHONE ENCOUNTER
"Requested Prescriptions   Pending Prescriptions Disp Refills     norethindrone-ethinyl estradiol (MICROGESTIN 1.5/30) 1.5-30 MG-MCG tablet 112 tablet 4     Sig: Take 1 tablet by mouth daily ,take continuous without placebo week, fill 4pkg for 3 mo       Contraceptives Protocol Passed - 11/11/2019 10:24 AM        Passed - Patient is not a current smoker if age is 35 or older        Passed - Recent (12 mo) or future (30 days) visit within the authorizing provider's specialty     Patient has had an office visit with the authorizing provider or a provider within the authorizing providers department within the previous 12 mos or has a future within next 30 days. See \"Patient Info\" tab in inbasket, or \"Choose Columns\" in Meds & Orders section of the refill encounter.              Passed - Medication is active on med list        Passed - No active pregnancy on record        Passed - No positive pregnancy test in past 12 months        Refills available  Trini Goldman RN on 11/11/2019 at 10:26 AM    "

## 2020-03-01 ENCOUNTER — HEALTH MAINTENANCE LETTER (OUTPATIENT)
Age: 35
End: 2020-03-01

## 2020-03-15 ENCOUNTER — VIRTUAL VISIT (OUTPATIENT)
Dept: FAMILY MEDICINE | Facility: OTHER | Age: 35
End: 2020-03-15

## 2020-03-15 ENCOUNTER — OFFICE VISIT (OUTPATIENT)
Dept: URGENT CARE | Facility: URGENT CARE | Age: 35
End: 2020-03-15
Payer: COMMERCIAL

## 2020-03-15 DIAGNOSIS — R05.9 COUGH: Primary | ICD-10-CM

## 2020-03-15 PROCEDURE — 99213 OFFICE O/P EST LOW 20 MIN: CPT | Performed by: NURSE PRACTITIONER

## 2020-03-15 NOTE — PATIENT INSTRUCTIONS
You are being tested for Corona Virus 19, Influenza and possibly RSV.    Please use the information at the end of this document to sign up for [facility] Di[employer] where you can get your results and a message about those results sent to you through the [email or web address] application. If you do not have mychart we will call you with your results but it may take longer.    Isolate Yourself:    Isolate yourself while traveling.    Do Not allow any visitors within 6 feet.    Do Not go to work or school.    Do Not go to [employer],  centers, shopping, or other public places.    Do Not shake hands.    Avoid close contact with others (hugging, kissing).    Protect Others:    Cover Your Mouth and Nose with a mask, disposable tissue or wash cloth to avoid spreading germs to others.    Wash your hands and face frequently with soap and water    Call Back If: Breathing difficulty develops or you become worse.    For more information about COVID19 and options for caring for yourself at home, please visit the CDC website at https://www.cdc.gov/coronavirus/2019-ncov/about/steps-when-sick.html  For more options for care at [facility], please visit our website at [email or web address]

## 2020-03-15 NOTE — PROGRESS NOTES
SUBJECTIVE:  This 35 year old female presents for COVID-19 testing.  she reports cough, chills, headache, sore throat, muscle aches.   Onset of symptoms 3/11/2020 Exposure history: [place], [place] 3/3-6/2020     OBJECTIVE:  Visual assessment shows:  GENERAL APPEARANCE is healthy without evident apparent respiratory distress  BREATHING: the patient is breathing comfortably and is speaking full sentences    ASSESSMENT/PLAN:    ICD-10-CM    1. Cough  R05 COVID-19 Virus (Coronavirus), PCR - Nasopharyngeal (NP) Swab in [facility] NP

## 2020-03-15 NOTE — PROGRESS NOTES
"Date: 03/15/2020 10:37:03  Clinician: Gloria Wolf  Clinician NPI: [id number]  Patient: Sarahi Gates  Patient : 1985  Patient Address: [address] [place] [address]  Patient Phone: [phone]  Visit Protocol: URI  Patient Summary:  Sarahi is a 35 year old ( : 1985 ) female who initiated a Visit for COVID-19 (Coronavirus) evaluation and screening. When asked the question \"Please sign me up to receive news, health information and promotions. \", Sarahi responded \"No\".    Sarahi states her symptoms started gradually 3-6 days ago.   Her symptoms consist of malaise, a headache, rhinitis, facial pain or pressure, myalgia, chills, a sore throat, a cough, and nasal congestion.   Symptom details     Nasal secretions: The color of her mucus is yellow, clear, and white.    Cough: Sarahi coughs a few times an hour and her cough is not more bothersome at night. Phlegm comes into her throat when she coughs. She does not believe her cough is caused by post-nasal drip. The color of the phlegm is white, clear, and yellow.     Sore throat: Sarahi reports having mild throat pain (1-3 on a 10 point pain scale), does not have exudate on her tonsils, and can swallow liquids. The lymph nodes in her neck are not enlarged. A rash has not appeared on the skin since the sore throat started.     Facial pain or pressure: The facial pain or pressure feels worse when bending over or leaning forward.     Headache: She states the headache is mild (1-3 on a 10 point pain scale).      Sarahi denies having ear pain, fever, enlarged lymph nodes, wheezing, and teeth pain. She also denies having a sinus infection within the past year, having recent facial or sinus surgery in the past 60 days, double sickening (worsening symptoms after initial improvement), and taking antibiotic medication for the symptoms. She is not experiencing dyspnea.   Precipitating events  Sarahi is not sure if she has been exposed to someone with strep " throat. She has not recently been exposed to someone with influenza. Sarahi has been in close contact with the following high risk individuals: children under the age of 5.   Pertinent COVID-19 (Coronavirus) information  Sarahi has traveled internationally or to the areas where COVID-19 (Coronavirus) is widespread in the last 14 days before the start of her symptoms. Countries or locations traveled as reported by the patient (free text): [place], [place]   Sarahi has not had close contact with a suspected or laboratory-confirmed COVID-19 patient within 14 days of symptom onset.   Sarahi is not a healthcare worker and does not work in a healthcare facility.   Pertinent medical history  Sarahi does not get yeast infections when she takes antibiotics.   Sarahi does not need a return to work/school note.   Weight: 145 lbs   Sarahi does not smoke or use smokeless tobacco.   She denies pregnancy and denies breastfeeding. She is currently menstruating.   Weight: 145 lbs    MEDICATIONS: Junel FE 1.5/30 (28) oral, sertraline oral, Zyrtec oral, ALLERGIES: NKDA  Clinician Response:  Dear Sarahi,   Dear Sarahi Gates,  Based on the information you have provided, it is recommended that you go to one of our designated Corona Virus 19 testing centers to get a test done from your car. To do this follow these instructions:  You should go to one of our dedicated testing centers as soon as possible during the hours below at one of these locations:   Walk-in Care: [facility] at [address] [address], [place] [address]. Hours: M-F 7am - 6pm, Sat-Sun 8am -- 3pm   [facility] at [address] [place] [address]. Hours: Every Day 9am -- 7pm  Walk-in Care: [facility] at [address] [place] [address]. Hours: M-F 7am - 6pm, Sat-Sun 8am -- 3pm  M [address] [place] [address]. Hours: M-F 11am -- 7pm, Sat-Sun 9am-4pm   What to expect:    When you arrive please come park in the parking lot.  Call [phone] and let them know which of the four clinics you are at, description of your car and where you are parked. Mention you did an [facility] visit and were sent for testing.  They will add you to the queue to get your test (you will stay in your car the entire time).  On that phone call you will give them the information to register your for the visit.  You will then be met by a provider who will perform a brief assessment in your car and collect samples to send for Corona Virus 19, influenza and possibly RSV.  You will be given patient information about respiratory illnesses and instructions. You with the results if you are not on mychart.   Isolate Yourself:   Isolate yourself while traveling.  Do Not allow any visitors within 6 feet.  Do Not go to work or school.  Do Not go to [employer],  centers, shopping, or other public places.  Do Not shake hands.  Avoid close contact with others (hugging, kissing).  Protect Others:  Cover Your Mouth and Nose with a mask, disposable tissue or wash cloth to avoid spreading germs to others.  Wash your hands and face frequently with soap and water   Fever Medicines:   For fever relief, take acetaminophen or ibuprofen.  Treat fevers above 101deg F (38.3deg C) to lower fevers and make you more comfortable.   Acetaminophen (e.g., Tylenol): Take 650 mg (two 325 mg pills) by mouth every 4-6 hours as needed of regular strength Tylenol or 1,000 mg (two 500 mg pills) every 8 hours as needed of Extra Strength Tylenol.   Ibuprofen (e.g., Motrin, Advil): Take 400 mg (two 200 mg pills) by mouth every 6 hours as needed.   Acetaminophen is thought to be safer than ibuprofen or naproxen for people over 65 years old. Acetaminophen is in many OTC and prescription medicines. It might be in more than one medicine that you are taking. You need to be careful and not take an overdose. Before taking any medicine, read all the  instructions on the package.  Caution -NSAIDs (e.g., ibuprofen, naproxen): Do not take nonsteroidal anti-inflammatory drugs (NSAIDs) if you have stomach problems, kidney disease, heart failure, or other contraindications to using this type of medicine. Do not take NSAID medicines for over 7 days without consulting your PCP. Do not take NSAID medicines if you are pregnant. Do not take NSAID medicines if you are also taking blood thinners.   Call Back If: Breathing difficulty develops or you become worse.  Thank you for limiting contact with others, wearing a simple mask to cover your cough, practice good hand hygiene habits and accessing our virtual services where possible to limit the spread of this virus.  For more information about COVID19 and options for caring for yourself at home, please visit the CDC website at https://www.cdc.gov/coronavirus/2019-ncov/about/steps-when-sick.html  For more options for care at [facility], please visit our website at [email or web address]    Diagnosis: Cough  Diagnosis ICD: R05

## 2020-03-22 ENCOUNTER — TELEPHONE (OUTPATIENT)
Dept: EMERGENCY MEDICINE | Facility: CLINIC | Age: 35
End: 2020-03-22

## 2020-03-22 LAB — COVID-19 VIRUS PCR RESULT FROM MDH: NEGATIVE

## 2020-03-22 NOTE — TELEPHONE ENCOUNTER
Coronavirus (COVID-19) Notification    Reason for call  Notify of Negative COVID-19 lab result, assess symptoms,  review [facility] recommendations    Lab Result    Lab test 2019-nCoV rRt-PCR  Oropharyngeal AND/OR nasopharyngeal swabs were NEGATIVE for 2019-nCoV RNA    RN Recommendations/Instructions per [facility]  Patient notified of Negative COVID-19.    Patient can discontinue Quarantee and is free to resume normal activites.        [RN/LPN Name]  Fabricio Lopez RN  [facility]  Emergency Dept Lab Result RN  Ph# [phone]

## 2020-03-22 NOTE — TELEPHONE ENCOUNTER
Coronavirus (COVID-19) Notification    Reason for call  Notify of Negative COVID-19 lab result, assess symptoms,  review [facility] recommendations    Lab Result   Lab test 2019-nCoV rRt-PCR  Oropharyngeal AND/OR nasopharyngeal swabs were NEGATIVE for 2019-nCoV RNA    Unable to reach Patient by phone at this time  Left voicemail message requesting a call back between 10A to 6:30P to [facility] Result phone line at [phone].         [RN/LPN Name]  Tammie Severson, LPN

## 2020-05-06 DIAGNOSIS — F32.81 PMDD (PREMENSTRUAL DYSPHORIC DISORDER): ICD-10-CM

## 2020-05-06 RX ORDER — NORETHINDRONE ACETATE AND ETHINYL ESTRADIOL 1.5; 3 MG/1; UG/1
TABLET ORAL
Qty: 126 TABLET | Refills: 0 | Status: SHIPPED | OUTPATIENT
Start: 2020-05-06 | End: 2020-09-02

## 2020-05-06 NOTE — TELEPHONE ENCOUNTER
"Requested Prescriptions   Pending Prescriptions Disp Refills     JUNEL 1.5/30 1.5-30 MG-MCG tablet [Pharmacy Med Name: JUNEL 1.5/30 TABLETS 21] 126 tablet      Sig: TAKE 1 TABLET BY MOUTH EVERY DAY       Contraceptives Protocol Failed - 5/6/2020  1:57 PM        Failed - Recent (12 mo) or future (30 days) visit within the authorizing provider's specialty     Patient has had an office visit with the authorizing provider or a provider within the authorizing providers department within the previous 12 mos or has a future within next 30 days. See \"Patient Info\" tab in inbasket, or \"Choose Columns\" in Meds & Orders section of the refill encounter.              Passed - Patient is not a current smoker if age is 35 or older        Passed - Medication is active on med list        Passed - No active pregnancy on record        Passed - No positive pregnancy test in past 12 months           Refill sent  Trini Goldman RN on 5/6/2020 at 2:07 PM    "

## 2020-05-12 DIAGNOSIS — F32.81 PMDD (PREMENSTRUAL DYSPHORIC DISORDER): ICD-10-CM

## 2020-05-12 NOTE — TELEPHONE ENCOUNTER
"Requested Prescriptions   Pending Prescriptions Disp Refills     sertraline (ZOLOFT) 50 MG tablet [Pharmacy Med Name: SERTRALINE 50MG TABLETS] 135 tablet 3     Sig: TAKE 1 AND 1/2 TABLETS BY MOUTH DAILY       SSRIs Protocol Failed - 5/12/2020  1:39 PM        Failed - PHQ-9 score less than 5 in past 6 months     Please review last PHQ-9 score.           Failed - Recent (6 mo) or future (30 days) visit within the authorizing provider's specialty     Patient had office visit in the last 6 months or has a visit in the next 30 days with authorizing provider or within the authorizing provider's specialty.  See \"Patient Info\" tab in inbasket, or \"Choose Columns\" in Meds & Orders section of the refill encounter.            Passed - Medication is active on med list        Passed - Patient is age 18 or older        Passed - No active pregnancy on record        Passed - No positive pregnancy test in last 12 months           Appointment needed for further refills  Trini Goldman RN on 5/12/2020 at 1:50 PM    "

## 2020-08-03 ENCOUNTER — VIRTUAL VISIT (OUTPATIENT)
Dept: FAMILY MEDICINE | Facility: OTHER | Age: 35
End: 2020-08-03

## 2020-08-03 NOTE — PROGRESS NOTES
"Date: 2020 07:42:34  Clinician: Dhraa Jara  Clinician NPI: [id number]  Patient: Sarahi Gates  Patient : 1985  Patient Address: [address] [place] [address]  Patient Phone: [phone]  Visit Protocol: URI  Patient Summary:  Sarahi is a 35 year old ( : 1985 ) female who initiated a Visit for COVID-19 (Coronavirus) evaluation and screening. When asked the question \"Please sign me up to receive news, health information and promotions. \", Sarahi responded \"No\".    Sarahi states her symptoms started 1-2 days ago.   Her symptoms consist of nausea, a sore throat, facial pain or pressure, nasal congestion, vomiting, rhinitis, malaise, a headache, and chills. She is experiencing difficulty breathing due to nasal congestion but she is not short of breath. Sarahi also feels feverish but was unable to measure her temperature.   Symptom details     Nasal secretions: The color of her mucus is blood-tinged and yellow.    Sore throat: Sarahi reports having moderate throat pain (4-6 on a 10 point pain scale), does not have exudate on her tonsils, and can swallow liquids. The lymph nodes in her neck are not enlarged. A rash has not appeared on the skin since the sore throat started.     Facial pain or pressure: The facial pain or pressure feels worse when bending over or leaning forward.     Headache: She states the headache is mild (1-3 on a 10 point pain scale).      Sarahi denies having wheezing, teeth pain, ageusia, diarrhea, myalgias, anosmia, cough, ear pain, and enlarged lymph nodes. She also denies having recent facial or sinus surgery in the past 60 days, taking antibiotic medication in the past month, and having a sinus infection within the past year.   Precipitating events  Within the past week, Sarahi has not been exposed to someone with strep throat. She has not recently been exposed to someone with influenza. Sarahi has been in close contact with the following high risk " individuals: adults 65 or older and children under the age of 5.   Pertinent COVID-19 (Coronavirus) information  In the past 14 days, Sarahi has not worked in a congregate living setting.   She does not work or volunteer as healthcare worker or a  and does not work or volunteer in a healthcare facility.   Sarahi also has not lived in a congregate living setting in the past 14 days. She does not live with a healthcare worker.   Sarahi has not had a close contact with a laboratory-confirmed COVID-19 patient within 14 days of symptom onset.   Pertinent medical history  Sarahi does not get yeast infections when she takes antibiotics.   Sarahi does not need a return to work/school note.   Weight: 145 lbs   Sarahi does not smoke or use smokeless tobacco.   She denies pregnancy and denies breastfeeding. She does not menstruate.   Weight: 145 lbs    MEDICATIONS: Junel FE 1.5/30 (28) oral, sertraline oral, Zyrtec oral, ALLERGIES: NKDA  Clinician Response:  Dear Sarahi,   Your symptoms show that you may have coronavirus (COVID-19). This illness can cause fever, cough and trouble breathing. Many people get a mild case and get better on their own. Some people can get very sick.  What should I do?  We would like to test you for this virus.   1. Please call [phone] to schedule your visit. Explain that you were referred by [facility] to have a COVID-19 test. Be ready to share your [facility] visit ID number.  The following will serve as your written order for this COVID Test, ordered by me, for the indication of suspected COVID [Z20.828]: The test will be ordered in [employer], our electronic health record, after you are scheduled. It will show as ordered and authorized by Avelino Raya MD.  Order: COVID-19 (Coronavirus) PCR for SYMPTOMATIC testing from [facility].      2. When it's time for your COVID test:  Stay at least 6 feet away from others. (If someone will drive you to your test, stay in the backseat, as far away from the   "as you can.)   Cover your mouth and nose with a mask, tissue or washcloth.  Go straight to the testing site. Don't make any stops on the way there or back.      3.Starting now: Stay home and away from others (self-isolate) until:   You've had no fever---and no medicine that reduces fever---for 3 full days (72 hours). And...   Your other symptoms have gotten better. For example, your cough or breathing has improved. And...   At least 10 days have passed since your symptoms started.       During this time, don't leave the house except for testing or medical care.   Stay in your own room, even for meals. Use your own bathroom if you can.   Stay away from others in your home. No hugging, kissing or shaking hands. No visitors.  Don't go to work, school or anywhere else.    Clean \"high touch\" surfaces often (doorknobs, counters, handles, etc.). Use a household cleaning spray or wipes. You'll find a full list of  on the EPA website: www.epa.gov/pesticide-registration/list-n-disinfectants-use-against-sars-cov-2.   Cover your mouth and nose with a mask, tissue or washcloth to avoid spreading germs.  Wash your hands and face often. Use soap and water.  Caregivers in these groups are at risk for severe illness due to COVID-19:  o People 65 years and older  o People who live in a nursing home or long-term care facility  o People with chronic disease (lung, heart, cancer, diabetes, kidney, liver, immunologic)  o People who have a weakened immune system, including those who:   Are in cancer treatment  Take medicine that weakens the immune system, such as corticosteroids  Had a bone marrow or organ transplant  Have an immune deficiency  Have poorly controlled HIV or AIDS  Are obese (body mass index of 40 or higher)  Smoke regularly   o Caregivers should wear gloves while washing dishes, handling laundry and cleaning bedrooms and bathrooms.  o Use caution when washing and drying laundry: Don't shake dirty laundry, and use " the warmest water setting that you can.  o For more tips, go to www.cdc.gov/coronavirus/2019-ncov/downloads/10Things.pdf.    4.Sign up for [employer] barter.li. We know it's scary to hear that you might have COVID-19. We want to track your symptoms to make sure you're okay over the next 2 weeks. Please look for an email from [employer]---this is a free, online program that we'll use to keep in touch. To sign up, follow the link in the email. Learn more at [email or web address]  How can I take care of myself?   Get lots of rest. Drink extra fluids (unless a doctor has told you not to).   Take Tylenol (acetaminophen) for fever or pain. If you have liver or kidney problems, ask your family doctor if it's okay to take Tylenol.   Adults can take either:    650 mg (two 325 mg pills) every 4 to 6 hours, or...   1,000 mg (two 500 mg pills) every 8 hours as needed.    Note: Don't take more than 3,000 mg in one day. Acetaminophen is found in many medicines (both prescribed and over-the-counter medicines). Read all labels to be sure you don't take too much.   For children, check the Tylenol bottle for the right dose. The dose is based on the child's age or weight.    If you have other health problems (like cancer, heart failure, an organ transplant or severe kidney disease): Call your specialty clinic if you don't feel better in the next 2 days.       Know when to call 911. Emergency warning signs include:    Trouble breathing or shortness of breath Pain or pressure in the chest that doesn't go away Feeling confused like you haven't felt before, or not being able to wake up Bluish-colored lips or face.  Where can I get more information?    [employer] -- About COVID-19: [email or web address]   CDC -- What to Do If You're Sick: www.cdc.gov/coronavirus/2019-ncov/about/steps-when-sick.html   CDC -- Ending Home Isolation: www.cdc.gov/coronavirus/2019-ncov/hcp/disposition-in-home-patients.html   CDC -- Caring  for Someone: www.cdc.gov/coronavirus/2019-ncov/if-you-are-sick/care-for-someone.html   [facility] -- Interim Guidance for Hospital Discharge to Home: www.health.[facility].[place]/diseases/coronavirus/hcp/hospdischarge.pdf   [facility] clinical trials (COVID-19 research studies): clinicalaffairs.[facility]-clinical-trials    Below are the COVID-19 hotlines at the [place] Department of Health ([facility]). Interpreters are available.    For health questions: Call 061-932-9314 or 1-101.172.5275 (7 a.m. to 7 p.m.) For questions about schools and childcare: Call 595-507-8420 or 1-610.343.8518 (7 a.m. to 7 p.m.)    Diagnosis: Cough  Diagnosis ICD: R05

## 2020-08-04 DIAGNOSIS — Z20.822 COVID-19 RULED OUT: Primary | ICD-10-CM

## 2020-08-04 PROCEDURE — U0003 INFECTIOUS AGENT DETECTION BY NUCLEIC ACID (DNA OR RNA); SEVERE ACUTE RESPIRATORY SYNDROME CORONAVIRUS 2 (SARS-COV-2) (CORONAVIRUS DISEASE [COVID-19]), AMPLIFIED PROBE TECHNIQUE, MAKING USE OF HIGH THROUGHPUT TECHNOLOGIES AS DESCRIBED BY CMS-2020-01-R: HCPCS | Performed by: FAMILY MEDICINE

## 2020-08-06 ENCOUNTER — NURSE TRIAGE (OUTPATIENT)
Dept: NURSING | Facility: CLINIC | Age: 35
End: 2020-08-06

## 2020-08-06 ENCOUNTER — MYC REFILL (OUTPATIENT)
Dept: OBGYN | Facility: CLINIC | Age: 35
End: 2020-08-06

## 2020-08-06 DIAGNOSIS — F32.81 PMDD (PREMENSTRUAL DYSPHORIC DISORDER): ICD-10-CM

## 2020-08-06 LAB
SARS-COV-2 RNA SPEC QL NAA+PROBE: NOT DETECTED
SPECIMEN SOURCE: NORMAL

## 2020-08-06 NOTE — TELEPHONE ENCOUNTER
Coronavirus (COVID-19) Notification     Reason for call  Patient requesting results     Lab Result    Lab test 2019-nCoV rRt-PCR in process        RN Recommendations/Instructions per [facility]  Continue quarantee and following instructions until you receive the results     Please Contact your PCP clinic or return to the Emergency department if your:    Symptoms worsen or other concerning symptom's.     Patient informed that if test for COVID19 is POSITIVE,  you will receive a call typically within 48 hours from the test date (date lab collected).  If NEGATIVE result, you will receive a letter in the mail or [place].      [RN/LPN Name]  Laura Aburto RN  [facility]        Additional Information    Negative: [1] Caller is not with the adult (patient) AND [2] reporting urgent symptoms    Negative: Lab result questions    Negative: Medication questions    Negative: Caller can't be reached by phone    Negative: Caller has already spoken to PCP or another triager    Negative: RN needs further essential information from caller in order to complete triage    Negative: Requesting regular office appointment    Negative: [1] Caller requesting NON-URGENT health information AND [2] PCP's office is the best resource    Health Information question, no triage required and triager able to answer question    Protocols used: INFORMATION ONLY CALL-A-

## 2020-08-06 NOTE — TELEPHONE ENCOUNTER
"Requested Prescriptions   Pending Prescriptions Disp Refills     sertraline (ZOLOFT) 50 MG tablet [Pharmacy Med Name: SERTRALINE 50MG TABLETS] 135 tablet 0     Sig: TAKE 1 AND 1/2 TABLETS BY MOUTH DAILY       SSRIs Protocol Failed - 8/6/2020  9:31 AM        Failed - PHQ-9 score less than 5 in past 6 months     Please review last PHQ-9 score.           Failed - Recent (6 mo) or future (30 days) visit within the authorizing provider's specialty     Patient had office visit in the last 6 months or has a visit in the next 30 days with authorizing provider or within the authorizing provider's specialty.  See \"Patient Info\" tab in inbasket, or \"Choose Columns\" in Meds & Orders section of the refill encounter.            Passed - Medication is active on med list        Passed - Patient is age 18 or older        Passed - No active pregnancy on record        Passed - No positive pregnancy test in last 12 months           Pt due for annual, no appt scheduled. Pt already received one month extension. Rx denied.   Trini Goldman RN on 8/6/2020 at 9:33 AM    "

## 2020-08-18 ENCOUNTER — MYC REFILL (OUTPATIENT)
Dept: OBGYN | Facility: CLINIC | Age: 35
End: 2020-08-18

## 2020-08-18 DIAGNOSIS — F32.81 PMDD (PREMENSTRUAL DYSPHORIC DISORDER): ICD-10-CM

## 2020-08-18 NOTE — TELEPHONE ENCOUNTER
"Requested Prescriptions   Pending Prescriptions Disp Refills     sertraline (ZOLOFT) 50 MG tablet 135 tablet 0     Sig: TAKE 1 AND 1/2 TABLETS BY MOUTH DAILY       SSRIs Protocol Failed - 8/18/2020  7:23 AM        Failed - PHQ-9 score less than 5 in past 6 months     Please review last PHQ-9 score.           Failed - Recent (6 mo) or future (30 days) visit within the authorizing provider's specialty     Patient had office visit in the last 6 months or has a visit in the next 30 days with authorizing provider or within the authorizing provider's specialty.  See \"Patient Info\" tab in inbasket, or \"Choose Columns\" in Meds & Orders section of the refill encounter.            Passed - Medication is active on med list        Passed - Patient is age 18 or older        Passed - No active pregnancy on record        Passed - No positive pregnancy test in last 12 months           Last Written Prescription Date:  5/12/20  Last Fill Quantity: 135,  # refills: 0   Last office visit: 4/3/2019 with prescribing provider:  Dr Santoyo   Future Office Visit:      Pt due for annual, no appt scheduled. Pt already received one month extension. Rx denied.   Wendy Navas RN on 8/18/2020 at 12:12 PM        "

## 2020-09-02 ENCOUNTER — OFFICE VISIT (OUTPATIENT)
Dept: OBGYN | Facility: CLINIC | Age: 35
End: 2020-09-02
Payer: COMMERCIAL

## 2020-09-02 VITALS
SYSTOLIC BLOOD PRESSURE: 104 MMHG | DIASTOLIC BLOOD PRESSURE: 72 MMHG | BODY MASS INDEX: 24.11 KG/M2 | HEIGHT: 66 IN | WEIGHT: 150 LBS

## 2020-09-02 DIAGNOSIS — F32.81 PMDD (PREMENSTRUAL DYSPHORIC DISORDER): ICD-10-CM

## 2020-09-02 DIAGNOSIS — Z01.419 ENCOUNTER FOR GYNECOLOGICAL EXAMINATION WITHOUT ABNORMAL FINDING: Primary | ICD-10-CM

## 2020-09-02 PROCEDURE — 99395 PREV VISIT EST AGE 18-39: CPT | Performed by: OBSTETRICS & GYNECOLOGY

## 2020-09-02 RX ORDER — NORETHINDRONE ACETATE AND ETHINYL ESTRADIOL .03; 1.5 MG/1; MG/1
1 TABLET ORAL DAILY
Qty: 126 TABLET | Refills: 4 | Status: SHIPPED | OUTPATIENT
Start: 2020-09-02 | End: 2021-12-08

## 2020-09-02 SDOH — HEALTH STABILITY: MENTAL HEALTH: HOW MANY STANDARD DRINKS CONTAINING ALCOHOL DO YOU HAVE ON A TYPICAL DAY?: 1 OR 2

## 2020-09-02 SDOH — HEALTH STABILITY: MENTAL HEALTH: HOW OFTEN DO YOU HAVE A DRINK CONTAINING ALCOHOL?: 2-3 TIMES A WEEK

## 2020-09-02 SDOH — HEALTH STABILITY: MENTAL HEALTH: HOW OFTEN DO YOU HAVE 6 OR MORE DRINKS ON ONE OCCASION?: NEVER

## 2020-09-02 ASSESSMENT — PATIENT HEALTH QUESTIONNAIRE - PHQ9
5. POOR APPETITE OR OVEREATING: NOT AT ALL
SUM OF ALL RESPONSES TO PHQ QUESTIONS 1-9: 0

## 2020-09-02 ASSESSMENT — ANXIETY QUESTIONNAIRES
1. FEELING NERVOUS, ANXIOUS, OR ON EDGE: SEVERAL DAYS
3. WORRYING TOO MUCH ABOUT DIFFERENT THINGS: NOT AT ALL
7. FEELING AFRAID AS IF SOMETHING AWFUL MIGHT HAPPEN: NOT AT ALL
GAD7 TOTAL SCORE: 2
6. BECOMING EASILY ANNOYED OR IRRITABLE: NOT AT ALL
IF YOU CHECKED OFF ANY PROBLEMS ON THIS QUESTIONNAIRE, HOW DIFFICULT HAVE THESE PROBLEMS MADE IT FOR YOU TO DO YOUR WORK, TAKE CARE OF THINGS AT HOME, OR GET ALONG WITH OTHER PEOPLE: NOT DIFFICULT AT ALL
5. BEING SO RESTLESS THAT IT IS HARD TO SIT STILL: SEVERAL DAYS
2. NOT BEING ABLE TO STOP OR CONTROL WORRYING: NOT AT ALL

## 2020-09-02 ASSESSMENT — MIFFLIN-ST. JEOR: SCORE: 1402.15

## 2020-09-02 NOTE — PROGRESS NOTES
Sarahi is a 35 year old  female who presents for annual exam.     Besides routine health maintenance, she has no other health concerns today .    HPI:  The patient does not have a PCP  Working from home,       GYNECOLOGIC HISTORY:    No LMP recorded. (Menstrual status: Birth Control).      Her current contraception method is: oral contraceptives.  She  reports that she has never smoked. She has never used smokeless tobacco.    Patient is sexually active.  STD testing offered?  Declined     Last PHQ-9 score on record =   PHQ-9 SCORE 2020   PHQ-9 Total Score -   PHQ-9 Total Score 0     Last GAD7 score on record =   RODGER-7 SCORE 2020   Total Score 2   Total Score -     Alcohol Score = 3    HEALTH MAINTENANCE:  Cholesterol:   Recent Labs   Lab Test 14  0714   CHOL 163   HDL 48*      TRIG 64   CHOLHDLRATIO 3.4     Last Mammo: Not applicable, Result: Not applicable, Next Mammo: Due at age 42   Pap:   Lab Results   Component Value Date    PAP NIL NEG-HPV 2019    PAP NIL 2016    PAP NIL 12/10/2012     Colonoscopy: N/A, Result: Not applicable, Next Colonoscopy: at age 52.  Dexa: N/A    Health maintenance updated:  yes    HISTORY:  OB History    Para Term  AB Living   2 2 2 0 0 2   SAB TAB Ectopic Multiple Live Births   0 0 0 0 2      # Outcome Date GA Lbr Jose Antonio/2nd Weight Sex Delivery Anes PTL Lv   2 Term 17 39w4d 01:50 / 00:23 3.55 kg (7 lb 13.2 oz) F Vag-Spont EPI N RADHA      Name: BARRY HOPKINS      Apgar1: 8  Apgar5: 9   1 Term 13 38w4d 07:50 / 02:23 3.54 kg (7 lb 12.9 oz) M Vag-Spont EPI N RADHA      Name: Tyler      Apgar1: 9  Apgar5: 9       Patient Active Problem List   Diagnosis     Hip pain     Moderate persistent asthma     Seasonal affective disorder (H)     Patellofemoral pain syndrome     Family history of carrier of genetic disease     Abnormal Pap smear     Gastroenteritis     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     PMDD  (premenstrual dysphoric disorder)     Seasonal allergic rhinitis     Encounter for supervision of other normal pregnancy, unspecified trimester     Prenatal care, subsequent pregnancy     Family history of genetic disorder     Diet controlled gestational diabetes mellitus (GDM), antepartum     Encounter for triage in pregnant patient     Indication for care in labor or delivery     Vaginal delivery     Family history of malignant melanoma of skin     Chronic bilateral low back pain without sciatica     Sacral pain     Somatic dysfunction of sacral region     Thoracic segment dysfunction     Cephalgia     Cervical segment dysfunction     Past Surgical History:   Procedure Laterality Date     DENTAL SURGERY  2003    wisdom teeth     HC AMNIOCENTESIS DIAGNOSTIC  12/7/2016      Social History     Tobacco Use     Smoking status: Never Smoker     Smokeless tobacco: Never Used   Substance Use Topics     Alcohol use: Yes     Alcohol/week: 5.0 standard drinks     Types: 5 Standard drinks or equivalent per week     Frequency: 2-3 times a week     Drinks per session: 1 or 2     Binge frequency: Never     Comment: socially - not during pregnancy      Problem (# of Occurrences) Relation (Name,Age of Onset)    Allergies (4) Paternal Grandfather, Paternal Grandmother, Father, Brother    Asthma (1) Paternal Grandfather    Breast Cancer (1) Other: maternal great-aunt    Hypertension (1) Mother: PIH    Thyroid Disease (1) Mother: both hypo and hyper?            Current Outpatient Medications   Medication Sig     albuterol (ALBUTEROL) 108 (90 BASE) MCG/ACT inhaler Inhale 1-2 puffs into the lungs every 4 hours as needed for shortness of breath / dyspnea     cetirizine (ZYRTEC) 10 MG tablet Take 1 tablet by mouth every evening.     JUNEL 1.5/30 1.5-30 MG-MCG tablet TAKE 1 TABLET BY MOUTH EVERY DAY     sertraline (ZOLOFT) 50 MG tablet TAKE 1 AND 1/2 TABLETS BY MOUTH DAILY     No current facility-administered medications for this visit.   "    No Known Allergies    Past medical, surgical, social and family histories were reviewed and updated in EPIC.    ROS:   12 point review of systems negative other than symptoms noted below or in the HPI.  No urinary frequency or dysuria, bladder or kidney problems    EXAM:  /72   Ht 1.676 m (5' 6\")   Wt 68 kg (150 lb)   BMI 24.21 kg/m     BMI: Body mass index is 24.21 kg/m .    PHYSICAL EXAM:  Constitutional:   Appearance: Well nourished, well developed, alert, in no acute distress  Neck:  Lymph Nodes:  No lymphadenopathy present    Thyroid:  Gland size normal, nontender, no nodules or masses present  on palpation  Chest:  Respiratory Effort:  Breathing unlabored  Cardiovascular:    Heart: Auscultation:  Regular rate, normal rhythm, no murmurs present  Breasts: Inspection of Breasts:  No lymphadenopathy present., Palpation of Breasts and Axillae:  No masses present on palpation, no breast tenderness., Axillary Lymph Nodes:  No lymphadenopathy present. and No nodularity, asymmetry or nipple discharge bilaterally.  Gastrointestinal:   Abdominal Examination:  Abdomen nontender to palpation, tone normal without rigidity or guarding, no masses present, umbilicus without lesions   Liver and Spleen:  No hepatomegaly present, liver nontender to palpation    Hernias:  No hernias present  Lymphatic: Lymph Nodes:  No other lymphadenopathy present  Skin:  General Inspection:  No rashes present, no lesions present, no areas of  discoloration  Neurologic:    Mental Status:  Oriented X3.  Normal strength and tone, sensory exam                grossly normal, mentation intact and speech normal.    Psychiatric:   Mentation appears normal and affect normal/bright.         Pelvic Exam:  External Genitalia:     Normal appearance for age, no discharge present, no tenderness present, no inflammatory lesions present, color normal  Vagina:     Normal vaginal vault without central or paravaginal defects, no discharge present, no " inflammatory lesions present, no masses present  Bladder:     Nontender to palpation  Urethra:   Urethral Body:  Urethra palpation normal, urethra structural support normal   Urethral Meatus:  No erythema or lesions present  Cervix:     Appearance healthy, no lesions present, nontender to palpation, no bleeding present  Uterus:     Uterus: firm, normal sized and nontender, midplane in position.   Adnexa:     No adnexal tenderness present, no adnexal masses present  Perineum:     Perineum within normal limits, no evidence of trauma, no rashes or skin lesions present  Anus:     Anus within normal limits, no hemorrhoids present  Inguinal Lymph Nodes:     No lymphadenopathy present  Pubic Hair:     Normal pubic hair distribution for age  Genitalia and Groin:     No rashes present, no lesions present, no areas of discoloration, no masses present      COUNSELING:   Reviewed preventive health counseling, as reflected in patient instructions       Regular exercise       Healthy diet/nutrition    BMI: Body mass index is 24.21 kg/m .      ASSESSMENT:  35 year old female with satisfactory annual exam.    ICD-10-CM    1. Encounter for gynecological examination without abnormal finding  Z01.419    2. PMDD (premenstrual dysphoric disorder)  F32.81 norethindrone-ethinyl estradiol (JUNEL 1.5/30) 1.5-30 MG-MCG tablet     sertraline (ZOLOFT) 50 MG tablet       PLAN:  Refill zoloft and zoloft  Discussed covid precautions      Joan Santoyo MD

## 2020-09-03 ASSESSMENT — ANXIETY QUESTIONNAIRES: GAD7 TOTAL SCORE: 2

## 2020-10-06 ENCOUNTER — OFFICE VISIT (OUTPATIENT)
Dept: INTERNAL MEDICINE | Facility: CLINIC | Age: 35
End: 2020-10-06
Payer: COMMERCIAL

## 2020-10-06 VITALS
TEMPERATURE: 97.5 F | RESPIRATION RATE: 16 BRPM | BODY MASS INDEX: 23.89 KG/M2 | OXYGEN SATURATION: 97 % | SYSTOLIC BLOOD PRESSURE: 115 MMHG | HEART RATE: 82 BPM | WEIGHT: 148 LBS | DIASTOLIC BLOOD PRESSURE: 70 MMHG

## 2020-10-06 DIAGNOSIS — Z53.9 ERRONEOUS ENCOUNTER--DISREGARD: Primary | ICD-10-CM

## 2020-10-06 NOTE — PROGRESS NOTES
Subjective     Sarahi Gates is a 35 year old female who presents to clinic today for the following health issues:    HPI           Patient thought she was here to see dermatology. She does not wish to be seen in , she will reschedule with dermatology.

## 2020-12-14 ENCOUNTER — HEALTH MAINTENANCE LETTER (OUTPATIENT)
Age: 35
End: 2020-12-14

## 2021-04-07 NOTE — TELEPHONE ENCOUNTER
Aarti, genetic counselor at [facility] calls. See 16 telephone encounter from [facility]. Aarti reports she is going to email both Dr. Styles and Wendy De La Rosa NP (provides care for pt's ACADM positive son) in order to allow the providers to discuss how positive results for baby will affect  care. Aarti wanted to notify Dr. Styles of the reason for connecting both providers.   Left anicubicle venipuncture performed

## 2021-05-10 NOTE — TELEPHONE ENCOUNTER
"Requested Prescriptions   Pending Prescriptions Disp Refills     sertraline (ZOLOFT) 50 MG tablet 135 tablet 0     Sig: TAKE 1 AND 1/2 TABLETS BY MOUTH DAILY       SSRIs Protocol Failed - 8/6/2020 10:40 AM        Failed - PHQ-9 score less than 5 in past 6 months     Please review last PHQ-9 score.           Failed - Recent (6 mo) or future (30 days) visit within the authorizing provider's specialty     Patient had office visit in the last 6 months or has a visit in the next 30 days with authorizing provider or within the authorizing provider's specialty.  See \"Patient Info\" tab in inbasket, or \"Choose Columns\" in Meds & Orders section of the refill encounter.            Passed - Medication is active on med list        Passed - Patient is age 18 or older        Passed - No active pregnancy on record        Passed - No positive pregnancy test in last 12 months           Pt due for annual, no appt scheduled. Pt already received one month extension. Rx denied.   Trini Goldman RN on 8/6/2020 at 10:49 AM    " Internal Medicine Outpatient Progress Note    Chief Complaint   Patient presents with   • New Patient   • Sleep Problem     HPI:  Isabel Rivera female 86 year old presents to clinic to follow up regarding her chronic medical conditions as noted below.    Chronic medical history:  #GERD- Isabel was previously on omeprazole, but it was discontinued in December. Today she denies any symptoms of heart burn or indigestion. Her last EGD was done 8/6/20 and her esophagus was dilated at that time.    #Osteoarthritis- Isabel reports intermittent aches and pains. She has been using tramadol for 8+ years which started after her hip replacement. She tells me today that she would like to get off that medication. She also uses tylenol for pain as needed.    #Osteopoenia- Isabel's last bone density was done 1/3/20 with a T score of -1.6. She is currently on a calcium supplement.     #Hyperlipidemia- Isabel was previously on lovastatin which was then discontinued back in December due to her age, memory changes, and the fact that she has never had a heart attack or stroke. She is wondering if she should restart her lovastatin.    #Hypothyroidism- currently on levothyroxine, TSH was last checked on 12/22/20 and noted to be 2.420    #History of breast cancer- Isabel was diagnosed with breast cancer in 1986 and reportedly underwent a lumpectomy. She has not had any reoccurrences. Her last mammogram was done 11/27/19.    #History of colon cancer- Isabel was diagnosed with colon cancer in 2011. She had a hemicolectomy and 6 months of adjuvant chemotherapy with capecitabine. Her last colonoscopy was 1/11/17. She was advised only to have a follow up colonoscopy if symptoms developed.    #Trigeminal neuralgia- Isabel reports pain on the left side of her face for many years. It is in the distribution of the trigeminal nerve. She does feel it causes her pain every day but only intermittently. If it really bothers her she will take a tramadol. She also sees  a chiropractor monthly which she feels like helps. She is wondering if we should try any further treatment for her facial pain. She has tried gabapentin in the past but is made her too sleepy.    #Insomnia- Isabel has had issues with insomnia for years. She previously tried trazodone but felt it was too strong. She bought an over the counter sleep aid (diphenhydramine) which she would like to try, but wanted to ask first.    Review of Systems:  EENT: Denies any sore throat or nasal congestion  EYES: Denies any discharge or blurred vision  CARDIOVASCULAR: Denies any chest pain, edema, or shortness of breath on exertion  RESPIRATORY: Denies any cough, wheezing, or sputum  GASTROINTESTINAL: Denies any nausea, vomiting, or diarrhea  NEUROLOGIC: Denies dizziness, paresthesia, or memory loss  EXTREMITIES: Denies joint pain, back pain, or muscular pain    Patient Active Problem List   Diagnosis   • Breast cancer (CMS/[facility])   • Unspecified hypothyroidism   • Other and unspecified hyperlipidemia   • Colon cancer (CMS/[facility])   • Osteoarthrosis, unspecified whether generalized or localized, pelvic region and thigh   • S/P total hip arthroplasty   • GERD without esophagitis   • Primary insomnia   • Osteoporosis   • Primary osteoarthritis of right knee   • Trigeminal neuralgia       ALLERGIES:  No Known Allergies    Current Medications    ACETAMINOPHEN (TYLENOL) 500 MG TABLET    Take 1 tablet by mouth every 8 hours as needed for Pain.    BIOTIN 5000 PO    Take 5,000 mcg by mouth daily.    CALCIUM PO    Take 1 tablet by mouth daily.    GINKGO BILOBA 60 MG CAP    Take 60 mg by mouth daily.    GLUCOSAMINE-CHONDROITIN (GLUCOSAMINE CHONDR COMPLEX PO)    Take 1 tablet by mouth daily. Triple strength    MULTIPLE VITAMIN (MULTIVITAMINS PO)    Take  by mouth daily.    OMEGA-3 FATTY ACIDS (FISH OIL) 1200 MG CAPSULE    Take 1,200 mg by mouth daily.     POLYETHYLENE GLYCOL (GLYCOLAX, MIRALAX) PACKET    Take 17 g by mouth daily. As directed as  needed    POLYVINYL ALCOHOL-POVIDONE (REFRESH OP)    Apply to eye daily.    SENNOSIDES (NATURAL SENNA LAXATIVE PO)        TRAMADOL (ULTRAM) 50 MG TABLET    Take 1 tablet by mouth 2 times daily. Do not start before May 12, 2021.    TURMERIC PO           Family History   Problem Relation Age of Onset   • Cancer Mother         bladder   • Stroke Mother    • Heart disease Mother    • High blood pressure Mother    • Diabetes Mother    • Osteoarthritis Mother    • Myocardial Infarction Father    • Stroke Father    • Heart disease Father    • Cancer Sister         skin cancer   • Heart disease Sister    • Arthritis Sister         rheumatoid   • High blood pressure Sister    • Cancer Daughter         brain tumor, noncancerous   • Osteoarthritis Son    • Osteoarthritis Son        Social History     Social History Narrative    12/4/19-She is  with 3 kids and 4 grandkids. She is retired. She worked in the factory. She likes to play cards and go to exercise. She lives at [facility] alone. She does drive. Grab rails on the tub. Cell phone.          Physical Exam  Vitals:    05/10/21 1516   BP: 130/64   Pulse: 72   Temp: 97.5 °F (36.4 °C)       GENERAL: Patient is awake, alert, and in no apparent distress. Appears stated age.  EYES: PERRLA/EOMI.  EENT: Ears are clear without any cerumen or bulging erythematous membrane. Throat is clear. There is no sinus tenderness. No discolored rhinorrhea.   CARDIOVASCULAR: S1 and S2, no murmurs, pulse is regular, no peripheral edema.  RESPIRATORY: Clear to auscultation, no wheezes, no apparent shortness of breath.  GASTROINTESTINAL: Abdomen is soft, nontender without any hepatosplenomegaly.  NEUROLOGIC: Answers all questions without difficulty or confusion. Ambulates without difficulty.  MUSCULOSKELETAL: Able to move joints without any pain. No obvious deformities.   PSYCHIATRIC: Does not appear anxious or depressed.      ASSESSMENT AND PLAN:  Primary insomnia  Isabel has tried  melatonin and trazodone in the past without success. She recently bought diphenhydramine which she would like to try. She will give this a try and follow up with me in 2 months.    Trigeminal neuralgia  I agree with Isabel that tramadol is probably not the best option for her pain. She was going to start weaning her off over the next 6-8 months. For now we will decrease it to 1 tablet in the am and 1/2 of a tablet at night. She will then follow up with me in 2 months prior to starting the next taper down. We may consider a trial of carbamazepine for her facial pain. She does feel her chiropractor helps, so I encouraged her to continue seeing him.    Other and unspecified hyperlipidemia  I reassured Isabel that at her age with no history of a stroke or MI, I agree that she should discontinue the lovastatin.    Colon cancer  Monitor: The patient's malignant condition is unchanged.  Evaluation: No diagnostic tests required today.  Assessment/Treatment:  Isabel had a hemicolectomy and chemo back in 2011 and has remained cancer free. Her last colonoscopy was in 2017.  Condition will be reassessed per progress note      Return in about 2 months (around 7/10/2021) for follow up, wean off tramadol.     A total of 55 minutes were spent on chart review, face to face with the patient today, and documentation.    Orders Placed This Encounter   • BIOTIN 5000 PO   • Ginkgo Biloba 60 MG Cap   • TURMERIC PO   • Sennosides (NATURAL SENNA LAXATIVE PO)   • levothyroxine 112 MCG tablet

## 2021-09-04 DIAGNOSIS — F32.81 PMDD (PREMENSTRUAL DYSPHORIC DISORDER): ICD-10-CM

## 2021-09-07 NOTE — TELEPHONE ENCOUNTER
"Requested Prescriptions   Pending Prescriptions Disp Refills     sertraline (ZOLOFT) 50 MG tablet [Pharmacy Med Name: SERTRALINE 50MG TABLETS] 135 tablet 3     Sig: TAKE 1 AND 1/2 TABLETS BY MOUTH DAILY       SSRIs Protocol Failed - 9/4/2021 10:20 AM        Failed - PHQ-9 score less than 5 in past 6 months     Please review last PHQ-9 score.           Failed - Recent (6 mo) or future (30 days) visit within the authorizing provider's specialty     Patient had office visit in the last 6 months or has a visit in the next 30 days with authorizing provider or within the authorizing provider's specialty.  See \"Patient Info\" tab in inbasket, or \"Choose Columns\" in Meds & Orders section of the refill encounter.            Passed - Medication is active on med list        Passed - Patient is age 18 or older        Passed - No active pregnancy on record        Passed - No positive pregnancy test in last 12 months         Medication is being filled for 1 time refill only due to:  Patient needs to be seen because due for annual exam.    Trini Goldman RN on 9/7/2021 at 8:15 AM    "

## 2021-09-10 DIAGNOSIS — F32.81 PMDD (PREMENSTRUAL DYSPHORIC DISORDER): ICD-10-CM

## 2021-09-10 NOTE — TELEPHONE ENCOUNTER
"Requested Prescriptions   Pending Prescriptions Disp Refills     sertraline (ZOLOFT) 50 MG tablet [Pharmacy Med Name: SERTRALINE 50MG TABLETS] 45 tablet 0     Sig: TAKE 1 AND 1/2 TABLETS BY MOUTH DAILY       SSRIs Protocol Failed - 9/10/2021  8:08 AM        Failed - PHQ-9 score less than 5 in past 6 months     Please review last PHQ-9 score.           Failed - Recent (6 mo) or future (30 days) visit within the authorizing provider's specialty     Patient had office visit in the last 6 months or has a visit in the next 30 days with authorizing provider or within the authorizing provider's specialty.  See \"Patient Info\" tab in inbasket, or \"Choose Columns\" in Meds & Orders section of the refill encounter.            Passed - Medication is active on med list        Passed - Patient is age 18 or older        Passed - No active pregnancy on record        Passed - No positive pregnancy test in last 12 months           Last Written Prescription Date:  9/7/21  Last Fill Quantity: 45,  # refills: 0   Last office visit: 9/2/2020 with prescribing provider:  Dr Santoyo   Future Office Visit:      Medication is being filled for 1 time refill only due to:  Patient needs to be seen because it has been more than one year since last visit.  Wendy Navas RN on 9/10/2021 at 8:10 AM        "

## 2021-10-02 ENCOUNTER — HEALTH MAINTENANCE LETTER (OUTPATIENT)
Age: 36
End: 2021-10-02

## 2021-11-17 NOTE — DISCHARGE INSTRUCTIONS
Discharge Instruction for Undelivered Patients      You were seen for: Membrane Assessment  We Consulted: DR TRAMMELL  You had (Test or Medicine): AMNISURE NEGATIVE & FETAL MONITORING WITHIN NORMAL LIMITS    Diet:   To manager your diabetes, follow the guidelines for eating and drinking given to you by your Clinic Provider or Diabetes Educator.       Activity:  Call your doctor or nurse midwife if your baby is moving less than usual.     Call your provider if you notice:  Swelling in your face or increased swelling in your hands or legs.  Headaches that are not relieved by Tylenol (acetaminophen).  Changes in your vision (blurring: seeing spots or stars.)  Nausea (sick to your stomach) and vomiting (throwing up).   Weight gain of 5 pounds or more per week.  Heartburn that doesn't go away.  Signs of bladder infection: pain when you urinate (use the toilet), need to go more often and more urgently.  The bag of james (rupture of membranes) breaks, or you notice leaking in your underwear.  Bright red blood in your underwear.  Abdominal (lower belly) or stomach pain.  For first baby: Contractions (tightening) less than 5 minutes apart for one hour or more.  Second (plus) baby: Contractions (tightening) less than 10 minutes apart and getting stronger.  *If less than 34 weeks: Contractions (tightenings) more than 6 times in one hour.  Increase or change in vaginal discharge (note the color and amount)  Other:     Follow-up:  As scheduled in the clinic        
Unable to assess

## 2021-11-27 DIAGNOSIS — F32.81 PMDD (PREMENSTRUAL DYSPHORIC DISORDER): ICD-10-CM

## 2021-12-07 NOTE — PROGRESS NOTES
Sarahi is a 36 year old  female who presents for annual exam. Breast and pelvic exam.  Patient would like to talk about her anxiety and increase her medication.    HPI:  Periods regular  Stopped ocp due to side effects  Wants  to have vasectomy    On 1.5 tabs of zoloft 50mg  So effective dose is 75 mg  Would like to increase to 100 mg    , loves her job  2 kids    Had covid vaccine already  Has anxiety issues that she takes the zoloft for        GYNECOLOGIC HISTORY:    Patient's last menstrual period was 2021.  Regular menses? Yes  Menses every 20-21 days.  Length of menses: 4 days  Her current contraception method is: condoms  She  reports that she has never smoked. She has never used smokeless tobacco.  Patient is sexually active.  STD testing offered?  Declined     Last PHQ-9 score on record =   PHQ-9 SCORE 2021   PHQ-9 Total Score -   PHQ-9 Total Score 3     Last GAD7 score on record =   RODGER-7 SCORE 2021   Total Score 11   Total Score -     Alcohol Score = 3    HEALTH MAINTENANCE:    Cholesterol:   Cholesterol   Date Value Ref Range Status   2014 163 <200 mg/dL Final     Comment:     LDL Cholesterol is the primary guide to therapy.   The NCEP recommends further evaluation of: patients with cholesterol greater   than 200 mg/dL if additional risk factors are present, cholesterol greater   than   240 mg/dL, triglycerides greater than 150 mg/dL, or HDL less than 40 mg/dL.     2011 211 (H) 0 - 200 mg/dL Final     Comment:     LDL Cholesterol is the primary guide to therapy.   The NCEP recommends further evaluation of: patients with cholesterol <200   mg/dL   if additional risk factors are present, cholesterol >240 mg/dL, triglycerides   >150 mg/dL, or HDL <40 mg/dL.      Last Mammo: Not applicable, Result: Not applicable    Pap:     Lab Results   Component Value Date    PAP NIL, NEG-HPV 2019    PAP NIL 2016    PAP NIL 12/10/2012     [facility]  updated:  yes    HISTORY:  OB History    Para Term  AB Living   2 2 2 0 0 2   SAB IAB Ectopic Multiple Live Births   0 0 0 0 2      # Outcome Date GA Lbr Jose Antonio/2nd Weight Sex Delivery Anes PTL Lv   2 Term 17 39w4d 01:50 / 00:23 3.55 kg (7 lb 13.2 oz) F Vag-Spont EPI N RADHA      Name: BARRY HOPKINS      Apgar1: 8  Apgar5: 9   1 Term 13 38w4d 07:50 / 02:23 3.54 kg (7 lb 12.9 oz) M Vag-Spont EPI N RADHA      Name: Tyler      Apgar1: 9  Apgar5: 9       Patient Active Problem List   Diagnosis     Hip pain     Asthma     Moderate persistent asthma     Seasonal affective disorder (H)     Patellofemoral pain syndrome     Family history of carrier of genetic disease     Abnormal Pap smear     Gastroenteritis     Mild major depression since 23y/o carmelo premenstrual     PMDD (premenstrual dysphoric disorder)     Seasonal allergic rhinitis     Encounter for supervision of other normal pregnancy, unspecified trimester     Prenatal care, subsequent pregnancy     Family history of genetic disorder     Diet controlled gestational diabetes mellitus (GDM), antepartum     Encounter for triage in pregnant patient     Indication for care in labor or delivery     Vaginal delivery     Family history of malignant melanoma of skin     Chronic bilateral low back pain without sciatica     Sacral pain     Somatic dysfunction of sacral region     Thoracic segment dysfunction     Cephalgia     Cervical segment dysfunction     Past Surgical History:   Procedure Laterality Date     DENTAL SURGERY      wisdom teeth     HC AMNIOCENTESIS DIAGNOSTIC  2016      Social History     Tobacco Use     Smoking status: Never Smoker     Smokeless tobacco: Never Used   Substance Use Topics     Alcohol use: Yes     Alcohol/week: 5.0 standard drinks     Types: 5 Standard drinks or equivalent per week     Comment: socially - not during pregnancy      Problem (# of Occurrences) Relation (Name,Age of Onset)    Allergies (4) Paternal  "Grandfather, Paternal Grandmother, Father, Brother    Asthma (1) Paternal Grandfather    Breast Cancer (1) Other: maternal great-aunt    Hypertension (1) Mother: PIH    Thyroid Disease (1) Mother: both hypo and hyper?            Current Outpatient Medications   Medication Sig     albuterol (ALBUTEROL) 108 (90 BASE) MCG/ACT inhaler Inhale 1-2 puffs into the lungs every 4 hours as needed for shortness of breath / dyspnea     cetirizine (ZYRTEC) 10 MG tablet Take 1 tablet by mouth every evening.     Cholecalciferol (VITAMIN D3 PO) Take by mouth daily     sertraline (ZOLOFT) 50 MG tablet TAKE 1 AND 1/2 TABLETS BY MOUTH DAILY     No current facility-administered medications for this visit.     No Known Allergies    Past medical, surgical, social and family histories were reviewed and updated in EPIC.    ROS:   12 point review of systems negative other than symptoms noted below or in the HPI.  Psychiatric: Anxiety  No urinary frequency or dysuria, bladder or kidney problems    EXAM:  /74   Ht 1.676 m (5' 6\")   Wt 70.3 kg (155 lb)   LMP 12/05/2021   BMI 25.02 kg/m     BMI: Body mass index is 25.02 kg/m .    PHYSICAL EXAM:  Constitutional:   Appearance: Well nourished, well developed, alert, in no acute distress  Neck:  Lymph Nodes:  No lymphadenopathy present    Thyroid:  Gland size normal, nontender, no nodules or masses present  on palpation    Breasts: Inspection of Breasts:  No lymphadenopathy present., Palpation of Breasts and Axillae:  No masses present on palpation, no breast tenderness., Axillary Lymph Nodes:  No lymphadenopathy present. and No nodularity, asymmetry or nipple discharge bilaterally.  Gastrointestinal:   Abdominal Examination:  Abdomen nontender to palpation, tone normal without rigidity or guarding, no masses present, umbilicus without lesions   Liver and Spleen:  No hepatomegaly present, liver nontender to palpation    Hernias:  No hernias present  Lymphatic: Lymph Nodes:  No other " lymphadenopathy present  Skin:  General Inspection:  No rashes present, no lesions present, no areas of  discoloration  Neurologic:    Mental Status:  Oriented X3.  Normal strength and tone, sensory exam                grossly normal, mentation intact and speech normal.    Psychiatric:   Mentation appears normal and affect normal/bright.         Pelvic Exam:  External Genitalia:     Normal appearance for age, no discharge present, no tenderness present, no inflammatory lesions present, color normal  Vagina:     Normal vaginal vault without central or paravaginal defects, no discharge present, no inflammatory lesions present, no masses present  Bladder:     Nontender to palpation  Urethra:   Urethral Body:  Urethra palpation normal, urethra structural support normal   Urethral Meatus:  No erythema or lesions present  Cervix:     Appearance healthy, no lesions present, nontender to palpation, no bleeding present  Uterus:     Uterus: firm, normal sized and nontender, midplane in position.   Adnexa:     No adnexal tenderness present, no adnexal masses present  Perineum:     Perineum within normal limits, no evidence of trauma, no rashes or skin lesions present  Anus:     Anus within normal limits, no hemorrhoids present  Inguinal Lymph Nodes:     No lymphadenopathy present  Pubic Hair:     Normal pubic hair distribution for age  Genitalia and Groin:     No rashes present, no lesions present, no areas of discoloration, no masses present      COUNSELING:   Reviewed preventive health counseling, as reflected in patient instructions    BMI: Body mass index is 25.02 kg/m .    ASSESSMENT:  36 year old female with satisfactory annual exam.    ICD-10-CM    1. Encounter for gynecological examination without abnormal finding  Z01.419        PLAN:  Return 1 years  Changed zoloft to 100 mg daily  Discussed contraception      Joan Santoyo MD

## 2021-12-08 ENCOUNTER — OFFICE VISIT (OUTPATIENT)
Dept: OBGYN | Facility: CLINIC | Age: 36
End: 2021-12-08
Payer: COMMERCIAL

## 2021-12-08 VITALS
DIASTOLIC BLOOD PRESSURE: 74 MMHG | SYSTOLIC BLOOD PRESSURE: 112 MMHG | BODY MASS INDEX: 24.91 KG/M2 | WEIGHT: 155 LBS | HEIGHT: 66 IN

## 2021-12-08 DIAGNOSIS — Z01.419 ENCOUNTER FOR GYNECOLOGICAL EXAMINATION WITHOUT ABNORMAL FINDING: Primary | ICD-10-CM

## 2021-12-08 DIAGNOSIS — F32.81 PMDD (PREMENSTRUAL DYSPHORIC DISORDER): ICD-10-CM

## 2021-12-08 PROCEDURE — 99395 PREV VISIT EST AGE 18-39: CPT | Performed by: OBSTETRICS & GYNECOLOGY

## 2021-12-08 ASSESSMENT — ANXIETY QUESTIONNAIRES
GAD7 TOTAL SCORE: 11
7. FEELING AFRAID AS IF SOMETHING AWFUL MIGHT HAPPEN: SEVERAL DAYS
3. WORRYING TOO MUCH ABOUT DIFFERENT THINGS: MORE THAN HALF THE DAYS
5. BEING SO RESTLESS THAT IT IS HARD TO SIT STILL: NOT AT ALL
1. FEELING NERVOUS, ANXIOUS, OR ON EDGE: MORE THAN HALF THE DAYS
IF YOU CHECKED OFF ANY PROBLEMS ON THIS QUESTIONNAIRE, HOW DIFFICULT HAVE THESE PROBLEMS MADE IT FOR YOU TO DO YOUR WORK, TAKE CARE OF THINGS AT HOME, OR GET ALONG WITH OTHER PEOPLE: VERY DIFFICULT
6. BECOMING EASILY ANNOYED OR IRRITABLE: SEVERAL DAYS
2. NOT BEING ABLE TO STOP OR CONTROL WORRYING: MORE THAN HALF THE DAYS

## 2021-12-08 ASSESSMENT — PATIENT HEALTH QUESTIONNAIRE - PHQ9: 5. POOR APPETITE OR OVEREATING: NEARLY EVERY DAY

## 2021-12-08 ASSESSMENT — MIFFLIN-ST. JEOR: SCORE: 1419.83

## 2021-12-09 ASSESSMENT — ANXIETY QUESTIONNAIRES: GAD7 TOTAL SCORE: 11

## 2021-12-09 ASSESSMENT — PATIENT HEALTH QUESTIONNAIRE - PHQ9: SUM OF ALL RESPONSES TO PHQ QUESTIONS 1-9: 3

## 2021-12-21 DIAGNOSIS — F32.81 PMDD (PREMENSTRUAL DYSPHORIC DISORDER): ICD-10-CM

## 2021-12-21 RX ORDER — SERTRALINE HYDROCHLORIDE 100 MG/1
TABLET, FILM COATED ORAL
Qty: 90 TABLET | Refills: 3 | Status: SHIPPED | OUTPATIENT
Start: 2021-12-21 | End: 2022-04-20

## 2021-12-21 NOTE — TELEPHONE ENCOUNTER
"Requested Prescriptions   Pending Prescriptions Disp Refills     sertraline (ZOLOFT) 50 MG tablet [Pharmacy Med Name: SERTRALINE 50MG TABLETS] 45 tablet      Sig: TAKE 1 AND 1/2 TABLETS BY MOUTH DAILY       SSRIs Protocol Passed - 12/21/2021  1:57 PM        Passed - PHQ-9 score less than 5 in past 6 months     Please review last PHQ-9 score.           Passed - Medication is active on med list        Passed - Patient is age 18 or older        Passed - No active pregnancy on record        Passed - No positive pregnancy test in last 12 months        Passed - Recent (6 mo) or future (30 days) visit within the authorizing provider's specialty     Patient had office visit in the last 6 months or has a visit in the next 30 days with authorizing provider or within the authorizing provider's specialty.  See \"Patient Info\" tab in inbasket, or \"Choose Columns\" in Meds & Orders section of the refill encounter.               Last Written Prescription Date:  12/8/21  Last Fill Quantity: 90,  # refills: 3   Last office visit: 12/8/2021 with prescribing provider:  Yarelis   Future Office Visit:      Dose changed to 100mg daily per 12/8/21 office visit with Dr Santoyo  Prescription approved per [facility] Refill Protocol.  Lindsey Osorio RN on 12/21/2021 at 2:29 PM        "

## 2022-04-20 ENCOUNTER — OFFICE VISIT (OUTPATIENT)
Dept: FAMILY MEDICINE | Facility: CLINIC | Age: 37
End: 2022-04-20
Payer: COMMERCIAL

## 2022-04-20 VITALS
RESPIRATION RATE: 16 BRPM | SYSTOLIC BLOOD PRESSURE: 112 MMHG | OXYGEN SATURATION: 99 % | HEIGHT: 66 IN | DIASTOLIC BLOOD PRESSURE: 68 MMHG | TEMPERATURE: 97.1 F | BODY MASS INDEX: 25.38 KG/M2 | WEIGHT: 157.9 LBS | HEART RATE: 77 BPM

## 2022-04-20 DIAGNOSIS — L85.3 DRY SKIN: ICD-10-CM

## 2022-04-20 DIAGNOSIS — N92.6 IRREGULAR PERIODS: ICD-10-CM

## 2022-04-20 DIAGNOSIS — R53.83 FATIGUE, UNSPECIFIED TYPE: Primary | ICD-10-CM

## 2022-04-20 DIAGNOSIS — Z13.1 SCREENING FOR DIABETES MELLITUS: ICD-10-CM

## 2022-04-20 DIAGNOSIS — Z83.49 FAMILY HISTORY OF THYROID DISEASE: ICD-10-CM

## 2022-04-20 DIAGNOSIS — Z13.220 LIPID SCREENING: ICD-10-CM

## 2022-04-20 LAB
ERYTHROCYTE [DISTWIDTH] IN BLOOD BY AUTOMATED COUNT: 12.3 % (ref 10–15)
HCT VFR BLD AUTO: 42.3 % (ref 35–47)
HGB BLD-MCNC: 14.4 G/DL (ref 11.7–15.7)
MCH RBC QN AUTO: 30.6 PG (ref 26.5–33)
MCHC RBC AUTO-ENTMCNC: 34 G/DL (ref 31.5–36.5)
MCV RBC AUTO: 90 FL (ref 78–100)
PLATELET # BLD AUTO: 283 10E3/UL (ref 150–450)
RBC # BLD AUTO: 4.71 10E6/UL (ref 3.8–5.2)
WBC # BLD AUTO: 5.4 10E3/UL (ref 4–11)

## 2022-04-20 PROCEDURE — 84443 ASSAY THYROID STIM HORMONE: CPT | Performed by: PHYSICIAN ASSISTANT

## 2022-04-20 PROCEDURE — 80061 LIPID PANEL: CPT | Performed by: PHYSICIAN ASSISTANT

## 2022-04-20 PROCEDURE — 99213 OFFICE O/P EST LOW 20 MIN: CPT | Performed by: PHYSICIAN ASSISTANT

## 2022-04-20 PROCEDURE — 85027 COMPLETE CBC AUTOMATED: CPT | Performed by: PHYSICIAN ASSISTANT

## 2022-04-20 PROCEDURE — 36415 COLL VENOUS BLD VENIPUNCTURE: CPT | Performed by: PHYSICIAN ASSISTANT

## 2022-04-20 PROCEDURE — 80053 COMPREHEN METABOLIC PANEL: CPT | Performed by: PHYSICIAN ASSISTANT

## 2022-04-20 RX ORDER — SERTRALINE HYDROCHLORIDE 100 MG/1
100 TABLET, FILM COATED ORAL DAILY
COMMUNITY
Start: 2022-04-20 | End: 2022-06-22

## 2022-04-20 ASSESSMENT — PATIENT HEALTH QUESTIONNAIRE - PHQ9
SUM OF ALL RESPONSES TO PHQ QUESTIONS 1-9: 1
10. IF YOU CHECKED OFF ANY PROBLEMS, HOW DIFFICULT HAVE THESE PROBLEMS MADE IT FOR YOU TO DO YOUR WORK, TAKE CARE OF THINGS AT HOME, OR GET ALONG WITH OTHER PEOPLE: NOT DIFFICULT AT ALL
SUM OF ALL RESPONSES TO PHQ QUESTIONS 1-9: 1

## 2022-04-20 ASSESSMENT — ASTHMA QUESTIONNAIRES: ACT_TOTALSCORE: 25

## 2022-04-20 ASSESSMENT — ANXIETY QUESTIONNAIRES
GAD7 TOTAL SCORE: 2
7. FEELING AFRAID AS IF SOMETHING AWFUL MIGHT HAPPEN: NOT AT ALL
6. BECOMING EASILY ANNOYED OR IRRITABLE: NOT AT ALL
3. WORRYING TOO MUCH ABOUT DIFFERENT THINGS: NOT AT ALL
5. BEING SO RESTLESS THAT IT IS HARD TO SIT STILL: NOT AT ALL
GAD7 TOTAL SCORE: 2
1. FEELING NERVOUS, ANXIOUS, OR ON EDGE: SEVERAL DAYS
7. FEELING AFRAID AS IF SOMETHING AWFUL MIGHT HAPPEN: NOT AT ALL
2. NOT BEING ABLE TO STOP OR CONTROL WORRYING: NOT AT ALL
4. TROUBLE RELAXING: SEVERAL DAYS
GAD7 TOTAL SCORE: 2

## 2022-04-20 NOTE — PROGRESS NOTES
"  Assessment & Plan     Sarahi was seen today for thyroid problem.    Diagnoses and all orders for this visit:    Fatigue, unspecified type  -     CBC with platelets; Future  -     CBC with platelets    Irregular periods  -     CBC with platelets; Future  -     CBC with platelets    Dry skin    Family history of thyroid disease    Screening for diabetes mellitus  -     Comprehensive metabolic panel (BMP + Alb, Alk Phos, ALT, AST, Total. Bili, TP); Future  -     Comprehensive metabolic panel (BMP + Alb, Alk Phos, ALT, AST, Total. Bili, TP)    Lipid screening  -     Lipid panel reflex to direct LDL Fasting; Future  -     Lipid panel reflex to direct LDL Fasting    Several symptoms that could be suggestive of thyroid disease and + Fhx of multiple relatives with same. Thus, will obtain labs for screening today. Pt also planning to see OB/GYN for further discussion on irregular periods if labs return normal. Will screen CBC as well to ensure no anemia.    Return in about 1 month (around 5/20/2022) for ob/gyn consult.    Khadijah Ye PA-C  [facility]   Sarahi is a 37 year old who presents for the following health issues:    HPI   Concern - thyroid concern , family history of thyroid issues  Feels fatigued , weight gain, irregular menstrual cycle (typically very consistent 21-day cycle, now varies between 25-40 day cycles; heavier - used to be 3-4 days now 7 days endorses hx of PMDD/\"crappy cycles\"- has follow-up scheduled with OB/GYN to discuss further; denies pregnancy LMP 3/24), dry skin.  FHx: mat gma, mother and mat aunt possibly also has hypothyroidism    Onset: x 6 months   Description: feeling fatigued , irregular menstrual cycle   Intensity: moderate  Progression of Symptoms:  constant  Accompanying Signs & Symptoms: no   Previous history of similar problem: no   Precipitating factors:        Worsened by: no   Alleviating factors:        Improved by: no   Therapies tried " "and outcome: None    Current Outpatient Medications   Medication     cetirizine (ZYRTEC) 10 MG tablet     Cholecalciferol (VITAMIN D3 PO)     sertraline (ZOLOFT) 100 MG tablet     No current facility-administered medications for this visit.      No Known Allergies    Review of Systems   Constitutional, HEENT, cardiovascular, pulmonary, gi and gu, endocrine, psych systems are negative, except as otherwise noted.      Objective    /68   Pulse 77   Temp 97.1  F (36.2  C) (Tympanic)   Resp 16   Ht 1.676 m (5' 6\")   Wt 71.6 kg (157 lb 14.4 oz)   LMP 03/24/2022   SpO2 99%   BMI 25.49 kg/m    Body mass index is 25.49 kg/m .  Physical Exam   GENERAL: healthy, alert and no distress  EYES: Eyes grossly normal to inspection, PERRL and conjunctivae and sclerae normal  HENT: ear canals and TM's normal, nose and mouth without ulcers or lesions  NECK: no adenopathy, no asymmetry, masses, or scars and thyroid normal to palpation  RESP: lungs clear to auscultation - no rales, rhonchi or wheezes  CV: regular rates and rhythm and no murmur, click or rub                "

## 2022-04-21 LAB
ALBUMIN SERPL-MCNC: 4.3 G/DL (ref 3.4–5)
ALP SERPL-CCNC: 52 U/L (ref 40–150)
ALT SERPL W P-5'-P-CCNC: 25 U/L (ref 0–50)
ANION GAP SERPL CALCULATED.3IONS-SCNC: 6 MMOL/L (ref 3–14)
AST SERPL W P-5'-P-CCNC: 22 U/L (ref 0–45)
BILIRUB SERPL-MCNC: 0.5 MG/DL (ref 0.2–1.3)
BUN SERPL-MCNC: 10 MG/DL (ref 7–30)
CALCIUM SERPL-MCNC: 9.4 MG/DL (ref 8.5–10.1)
CHLORIDE BLD-SCNC: 106 MMOL/L (ref 94–109)
CHOLEST SERPL-MCNC: 270 MG/DL
CO2 SERPL-SCNC: 26 MMOL/L (ref 20–32)
CREAT SERPL-MCNC: 0.74 MG/DL (ref 0.52–1.04)
FASTING STATUS PATIENT QL REPORTED: YES
GFR SERPL CREATININE-BSD FRML MDRD: >90 ML/MIN/1.73M2
GLUCOSE BLD-MCNC: 94 MG/DL (ref 70–99)
HDLC SERPL-MCNC: 63 MG/DL
LDLC SERPL CALC-MCNC: 167 MG/DL
NONHDLC SERPL-MCNC: 207 MG/DL
POTASSIUM BLD-SCNC: 3.9 MMOL/L (ref 3.4–5.3)
PROT SERPL-MCNC: 8 G/DL (ref 6.8–8.8)
SODIUM SERPL-SCNC: 138 MMOL/L (ref 133–144)
TRIGL SERPL-MCNC: 198 MG/DL

## 2022-04-21 ASSESSMENT — ANXIETY QUESTIONNAIRES: GAD7 TOTAL SCORE: 2

## 2022-04-21 ASSESSMENT — PATIENT HEALTH QUESTIONNAIRE - PHQ9: SUM OF ALL RESPONSES TO PHQ QUESTIONS 1-9: 1

## 2022-04-25 DIAGNOSIS — Z83.49 FAMILY HISTORY OF THYROID DISEASE: ICD-10-CM

## 2022-04-25 DIAGNOSIS — L85.3 DRY SKIN: ICD-10-CM

## 2022-04-25 DIAGNOSIS — R53.83 FATIGUE, UNSPECIFIED TYPE: Primary | ICD-10-CM

## 2022-04-25 DIAGNOSIS — N92.6 IRREGULAR PERIODS: ICD-10-CM

## 2022-04-25 LAB — TSH SERPL DL<=0.005 MIU/L-ACNC: 2.33 MU/L (ref 0.4–4)

## 2022-04-25 NOTE — RESULT ENCOUNTER NOTE
Dear Sarahi,      Your recent test results are noted below:    -TSH (thyroid stimulating hormone) level is normal which indicates normal thyroid function.    For additional lab test information, [email or web address] is an excellent reference. Please contact the clinic at [phone] with any further questions or concerns.    Sincerely,      Khadijah Ye PA-C  [facility]

## 2022-04-25 NOTE — RESULT ENCOUNTER NOTE
Dear Sarahi,      Your recent test results are noted below:    -Normal red blood cell (hgb) levels, normal white blood cell count and normal platelet levels.  -LDL(bad) cholesterol level is elevated, and your triglycerides are elevated which can increase your heart disease risk.  A diet high in fat and simple carbohydrates, genetics and being overweight can contribute to this. ADVISE: exercising 150 minutes of aerobic exercise per week (30 minutes for 5 days per week or 50 minutes for 3 days per week are options), eating a low saturated fat/low carbohydrate diet, and omega-3 fatty acids (fish oil) 0600-7857 mg daily are helpful to improve this.  -Liver and gallbladder tests are normal (ALT,AST, Alk phos, bilirubin), kidney function is normal (Cr, GFR), sodium is normal, potassium is normal, calcium is normal, glucose is normal.  Will notify you of the thyroid results once available.    For additional lab test information, [email or web address] is an excellent reference. Please contact the clinic at [phone] with any further questions or concerns.    Sincerely,      Khadijah Ye PA-C  [facility]

## 2022-06-22 DIAGNOSIS — F32.81 PMDD (PREMENSTRUAL DYSPHORIC DISORDER): Primary | ICD-10-CM

## 2022-06-22 RX ORDER — SERTRALINE HYDROCHLORIDE 100 MG/1
100 TABLET, FILM COATED ORAL DAILY
Qty: 30 TABLET | Refills: 0 | Status: SHIPPED | OUTPATIENT
Start: 2022-06-22 | End: 2023-02-16

## 2022-06-22 NOTE — TELEPHONE ENCOUNTER
"Requested Prescriptions   Pending Prescriptions Disp Refills     sertraline (ZOLOFT) 100 MG tablet [Pharmacy Med Name: SERTRALINE 100MG TABLETS] 90 tablet      Sig: TAKE 1 AND 1/2 TABLETS BY MOUTH DAILY       SSRIs Protocol Passed - 6/22/2022  9:47 AM        Passed - Recent (12 mo) or future (30 days) visit within the authorizing provider's specialty     Patient has had an office visit with the authorizing provider or a provider within the authorizing providers department within the previous 12 mos or has a future within next 30 days. See \"Patient Info\" tab in inbasket, or \"Choose Columns\" in Meds & Orders section of the refill encounter.              Passed - Medication is active on med list        Passed - Patient is age 18 or older        Passed - No active pregnancy on record        Passed - No positive pregnancy test in last 12 months             Previous dose increase to 100 mg by Dr Santoyo 12/8/2021 at annual.  Rx sent 12/21/21 for full year    Last office visit: 4/20/22 with provider:  Dr Khadijah Ye who reviewed medication profile but did not send refills.     Future Office Visit:   Next 5 appointments (look out 90 days)    Jul 14, 2022  9:15 AM  [facility] with [place] Angel MD  [facility] [place] ([facility] ) [address]  [address]  [facility] [id number] [phone]         Routing to on call provider - ok to send refill in christoph of Dr Santoyo for 1 month to get her to her next visit with another provider as listed above?    Wendy Navas RN on 6/22/2022 at 12:24 PM          "

## 2022-07-14 ENCOUNTER — OFFICE VISIT (OUTPATIENT)
Dept: OBGYN | Facility: CLINIC | Age: 37
End: 2022-07-14
Payer: COMMERCIAL

## 2022-07-14 VITALS
WEIGHT: 149 LBS | HEART RATE: 80 BPM | BODY MASS INDEX: 23.95 KG/M2 | HEIGHT: 66 IN | SYSTOLIC BLOOD PRESSURE: 116 MMHG | DIASTOLIC BLOOD PRESSURE: 78 MMHG

## 2022-07-14 DIAGNOSIS — N92.6 IRREGULAR BLEEDING: ICD-10-CM

## 2022-07-14 DIAGNOSIS — F32.81 PMDD (PREMENSTRUAL DYSPHORIC DISORDER): Primary | ICD-10-CM

## 2022-07-14 PROCEDURE — 99214 OFFICE O/P EST MOD 30 MIN: CPT | Performed by: OBSTETRICS & GYNECOLOGY

## 2022-07-14 RX ORDER — LEVONORGESTREL/ETHIN.ESTRADIOL 0.1-0.02MG
1 TABLET ORAL DAILY
Qty: 84 TABLET | Refills: 3 | Status: SHIPPED | OUTPATIENT
Start: 2022-07-14 | End: 2023-03-23

## 2022-07-14 RX ORDER — FLUOXETINE 10 MG/1
CAPSULE ORAL
Qty: 30 CAPSULE | Refills: 3 | Status: SHIPPED | OUTPATIENT
Start: 2022-07-14 | End: 2022-10-11

## 2022-07-14 NOTE — NURSING NOTE
"Chief Complaint   Patient presents with     Abnormal Uterine Bleeding     Irregular, long periodswith PMDD?PMS symptoms during midcycle.        Initial /78   Pulse 80   Ht 1.676 m (5' 6\")   Wt 67.6 kg (149 lb)   LMP 2022 (Exact Date)   Breastfeeding No   BMI 24.05 kg/m   Estimated body mass index is 24.05 kg/m  as calculated from the following:    Height as of this encounter: 1.676 m (5' 6\").    Weight as of this encounter: 67.6 kg (149 lb).  BP completed using cuff size: regular    Questioned patient about current smoking habits.  Pt. has never smoked.          The following HM Due: NONE      The following patient reported/Care Every where data was sent to:  P ABSTRACT QUALITY INITIATIVES [[id number]]  Janeth Prescott LPN               "

## 2022-07-14 NOTE — PROGRESS NOTES
SUBJECTIVE:                                                   Sarahi Gates is a 37 year old female  who presents to clinic today for abnormal uterine bleeding. Previously seen by Dr. Santoyo for this, last in . Last pelvic US 2016.    Menstrual cycles:  Currently, periods are monthly, but she does have some spotting from about a week before the onset of menses until the menses start.  She is on Junel 1/20.  Previously used this for birth control in the past, and while her periods were normal, she continued to have fatigue, nausea, and anxiety, which are her main concerns regarding PMS/PMDD.  And makes it very difficult for her to continue activities of daily living at times, because she is so fatigued and wiped out.  In 2021 she had an annual exam, and at that time noted that she had stopped oral contraceptive pills and actually thought she felt somewhat better off of them.    At that time, her Zoloft was increased to 100 mg to help with increased anxiety.  She has found this helpful.  While off of oral contraceptive pills, she took a vitamin that she states was formulated to help with premenstrual syndrome symptoms.  She thought maybe this helped a little, but after 4 to 5 months she noted that her periods were quite a bit more irregular off OCPs, every 18 to 40 days.  Her PMDD symptoms were much worse.  Therefore, because she could not get in to clinic for several months, she was restarted on her Junel.    This is made the periods more regular, but she still continues to have symptoms about a week before the onset of bleeding.  Once her bleeding starts, she feels much better.  Then she gets about a week or 2 where she feels well before symptoms start again.    She states that if she could fix the fatigue, increase in anxiety, and the cramping that she has in the week before the period starts, she would be happy.    Problem list and histories reviewed & adjusted, as indicated.  Additional  history: as documented.    Patient Active Problem List   Diagnosis     Hip pain     Seasonal affective disorder (H)     Patellofemoral pain syndrome     Family history of carrier of genetic disease     Abnormal Pap smear     Gastroenteritis     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     PMDD (premenstrual dysphoric disorder)     Seasonal allergic rhinitis     Encounter for supervision of other normal pregnancy, unspecified trimester     Prenatal care, subsequent pregnancy     Family history of genetic disorder     Diet controlled gestational diabetes mellitus (GDM), antepartum     Encounter for triage in pregnant patient     Indication for care in labor or delivery     Vaginal delivery     Family history of malignant melanoma of skin     Chronic bilateral low back pain without sciatica     Sacral pain     Somatic dysfunction of sacral region     Thoracic segment dysfunction     Cephalgia     Cervical segment dysfunction     Past Surgical History:   Procedure Laterality Date     ABDOMINOPLASTY  05/2022     DENTAL SURGERY  01/01/2003    wisdom teeth     HC AMNIOCENTESIS DIAGNOSTIC  12/07/2016      Social History     Tobacco Use     Smoking status: Never Smoker     Smokeless tobacco: Never Used   Substance Use Topics     Alcohol use: Yes     Alcohol/week: 5.0 standard drinks     Types: 5 Standard drinks or equivalent per week     Comment: socially - not during pregnancy      Problem (# of Occurrences) Relation (Name,Age of Onset)    Allergies (4) Paternal Grandfather, Paternal Grandmother, Father, Brother    Asthma (1) Paternal Grandfather    Breast Cancer (1) Other: maternal great-aunt    Hypertension (1) Mother: PIH    Thyroid Disease (1) Mother: both hypo and hyper?            cetirizine (ZYRTEC) 10 MG tablet, Take 1 tablet by mouth every evening.  Cholecalciferol (VITAMIN D3 PO), Take by mouth daily  norethindrone-ethinyl estradiol (JUNEL 1.5/30) 1.5-30 MG-MCG tablet, Take 1 tablet by mouth  daily  sertraline (ZOLOFT) 100 MG tablet, Take 1 tablet (100 mg) by mouth daily    No current facility-administered medications on file prior to visit.    No Known Allergies    ROS:  Negative other than as noted in HPI.    OBJECTIVE:     Exam:  No other examination was necessary for today's appointment.    In-Clinic Test Results:  No results found for this or any previous visit (from the past 24 hour(s)).    ASSESSMENT/PLAN:                                                        ICD-10-CM    1. PMDD (premenstrual dysphoric disorder)  F32.81    2. Irregular bleeding  N92.6 US Pelvic Transabdominal and Transvaginal       -We will repeat an ultrasound to make sure we are not missing a structural problem that might cause irregular bleeding.  She is also had an increase in cramping, so we will be good to evaluate the ovaries.  Discussed in detail, and ordered today.    -We do long discussion about PMS and PMDD, treatment of symptoms versus suppression with oral contraceptives or other hormones.  At this point, we have decided to continue with oral contraceptives but will switch this to a different pill.  We will also initiate Prozac daily starting the first day of the third week of the pill cycle, and continuing until the first normal day of bleeding, which is when her symptoms typically rajat.    I will follow-up with her in 3 months, either in clinic or with a virtual visit to see how she is progressing.  She will let me know if there are any significant changes in the meantime.    [place] Angel MD  [facility]

## 2022-07-25 ENCOUNTER — ANCILLARY PROCEDURE (OUTPATIENT)
Dept: ULTRASOUND IMAGING | Facility: CLINIC | Age: 37
End: 2022-07-25
Attending: OBSTETRICS & GYNECOLOGY
Payer: COMMERCIAL

## 2022-07-25 DIAGNOSIS — N92.6 IRREGULAR BLEEDING: ICD-10-CM

## 2022-07-25 PROCEDURE — 76856 US EXAM PELVIC COMPLETE: CPT | Performed by: OBSTETRICS & GYNECOLOGY

## 2022-07-25 PROCEDURE — 76830 TRANSVAGINAL US NON-OB: CPT | Performed by: OBSTETRICS & GYNECOLOGY

## 2022-07-26 DIAGNOSIS — N83.209 OVARIAN CYST: Primary | ICD-10-CM

## 2022-09-03 ENCOUNTER — HEALTH MAINTENANCE LETTER (OUTPATIENT)
Age: 37
End: 2022-09-03

## 2022-09-27 ENCOUNTER — ANCILLARY PROCEDURE (OUTPATIENT)
Dept: ULTRASOUND IMAGING | Facility: CLINIC | Age: 37
End: 2022-09-27
Attending: OBSTETRICS & GYNECOLOGY
Payer: COMMERCIAL

## 2022-09-27 DIAGNOSIS — N83.209 OVARIAN CYST: ICD-10-CM

## 2022-09-27 PROCEDURE — 76830 TRANSVAGINAL US NON-OB: CPT | Performed by: OBSTETRICS & GYNECOLOGY

## 2022-09-27 PROCEDURE — 76856 US EXAM PELVIC COMPLETE: CPT | Performed by: OBSTETRICS & GYNECOLOGY

## 2022-10-11 ENCOUNTER — VIRTUAL VISIT (OUTPATIENT)
Dept: OBGYN | Facility: CLINIC | Age: 37
End: 2022-10-11
Payer: COMMERCIAL

## 2022-10-11 DIAGNOSIS — F32.81 PMDD (PREMENSTRUAL DYSPHORIC DISORDER): ICD-10-CM

## 2022-10-11 DIAGNOSIS — N83.202 LEFT OVARIAN CYST: Primary | ICD-10-CM

## 2022-10-11 PROCEDURE — 99212 OFFICE O/P EST SF 10 MIN: CPT | Mod: 95 | Performed by: OBSTETRICS & GYNECOLOGY

## 2022-10-11 RX ORDER — FLUOXETINE 10 MG/1
CAPSULE ORAL
Qty: 30 CAPSULE | Refills: 6 | Status: SHIPPED | OUTPATIENT
Start: 2022-10-11 | End: 2023-03-23

## 2022-10-11 NOTE — PROGRESS NOTES
SUBJECTIVE:                                                   Sarahi Gates is a 37 year old female who presents for phone visit today to follow-up on results of a follow-up ultrasound.  This was done for bilateral ovarian cysts.  The cysts that were seen previously have resolved, and now there is a simple 6 cm left ovarian cyst present.  She is really not having any symptoms.  Occasionally think she feels a twinge in the pelvis, but she also thinks she may be attributing that to the cyst because she knows it is there.  Overall, not impacting her at all on a daily basis and she is not having any pain or other symptoms.    Problem list and histories reviewed & adjusted, as indicated.  Additional history: as documented.    Patient Active Problem List   Diagnosis     Hip pain     Seasonal affective disorder (H)     Patellofemoral pain syndrome     Family history of carrier of genetic disease     Abnormal Pap smear     Gastroenteritis     Mild major depression since 23y/o carmelo premenstrual     PMDD (premenstrual dysphoric disorder)     Seasonal allergic rhinitis     Encounter for supervision of other normal pregnancy, unspecified trimester     Prenatal care, subsequent pregnancy     Family history of genetic disorder     Diet controlled gestational diabetes mellitus (GDM), antepartum     Encounter for triage in pregnant patient     Indication for care in labor or delivery     Vaginal delivery     Family history of malignant melanoma of skin     Chronic bilateral low back pain without sciatica     Sacral pain     Somatic dysfunction of sacral region     Thoracic segment dysfunction     Cephalgia     Cervical segment dysfunction     Past Surgical History:   Procedure Laterality Date     ABDOMINOPLASTY  05/2022     DENTAL SURGERY  01/01/2003    wisdom teeth     HC AMNIOCENTESIS DIAGNOSTIC  12/07/2016      Social History     Tobacco Use     Smoking status: Never     Smokeless tobacco: Never   Substance Use Topics      Alcohol use: Yes     Alcohol/week: 5.0 standard drinks     Types: 5 Standard drinks or equivalent per week     Comment: socially - not during pregnancy      Problem (# of Occurrences) Relation (Name,Age of Onset)    Allergies (4) Paternal Grandfather, Paternal Grandmother, Father, Brother    Asthma (1) Paternal Grandfather    Hypertension (1) Mother: PIH    Thyroid Disease (1) Mother: both hypo and hyper?    Breast Cancer (1) Other: maternal great-aunt            cetirizine (ZYRTEC) 10 MG tablet, Take 1 tablet by mouth every evening.  Cholecalciferol (VITAMIN D3 PO), Take by mouth daily  FLUoxetine (PROZAC) 10 MG capsule, Take one daily starting on day 15 of the cycle through the first day of period.  levonorgestrel-ethinyl estradiol (AVIANE) 0.1-20 MG-MCG tablet, Take 1 tablet by mouth daily  sertraline (ZOLOFT) 100 MG tablet, Take 1 tablet (100 mg) by mouth daily    No current facility-administered medications on file prior to visit.    No Known Allergies    ROS:  Negative except as noted in HPI.    OBJECTIVE:     No exam was necessary today as this was entirely a counseling appointment on the phone.    In-Clinic Test Results:  No results found for this or any previous visit (from the past 24 hour(s)).    ASSESSMENT/PLAN:                                                        ICD-10-CM    1. Left ovarian cyst  N83.202 US Pelvic Transabdominal and Transvaginal            Discussed options.  She is currently on oral contraceptives.  She should continue these.  Since this appears to be a new cyst, is completely simple, and she is asymptomatic, I have recommended that we follow-up in approximately 6 to 8 weeks with a repeat ultrasound to see if it is persistent or growing.  If it persists or grows, we need to consider surgical intervention for removal.  If it is smaller or completely gone, she should continue her oral contraceptives and no further follow-up will be needed.    Phone time: 8 minutes    Elba Angel  MD  [facility]

## 2022-10-11 NOTE — NURSING NOTE
Sarahi Gates is a 37 year old female who is being evaluated via a billable telephone visit.      What phone number would you like to be contacted at? [phone]  How would you like to obtain your AVS? Jarvis      Sarahi Gates is a 37 year old female who presents for virtual visit today for the following health issue(s):  Patient presents with:  Telephone: Telephone follow up to discuss US results.

## 2022-10-17 ENCOUNTER — OFFICE VISIT (OUTPATIENT)
Dept: OBGYN | Facility: CLINIC | Age: 37
End: 2022-10-17
Payer: COMMERCIAL

## 2022-10-17 VITALS — SYSTOLIC BLOOD PRESSURE: 118 MMHG | WEIGHT: 146 LBS | DIASTOLIC BLOOD PRESSURE: 68 MMHG | BODY MASS INDEX: 23.57 KG/M2

## 2022-10-17 DIAGNOSIS — L98.9 SKIN LESION: Primary | ICD-10-CM

## 2022-10-17 PROCEDURE — 99212 OFFICE O/P EST SF 10 MIN: CPT | Performed by: OBSTETRICS & GYNECOLOGY

## 2022-10-17 NOTE — PROGRESS NOTES
"  Assessment & Plan     Skin lesion  - I advised Pt that this does not appear to be a primary breast issue, but rather a derm issue.  It looks benign, and may just be eczema, but will refer to Derm for evaluation.  - Adult Dermatology Referral; Future                 No follow-ups on file.    Carlos Vega MD  [facility] ZACKERY Mcclain is a 37 year old, presenting for the following health issues:  Breast Problem (Right side lump )      Pt here for lesion on lateral side of right breast she noted a month ago, it hasn't changed at all in size, does not itch or hurt, and she has no palpable breast lumps or nipple discharge.  She said it looks like a \"bug bite\".  Since it persists she thought she should have it evaluated.             Review of Systems         Objective    /68 (BP Location: Right arm, Patient Position: Sitting, Cuff Size: Adult Regular)   Wt 66.2 kg (146 lb)   LMP 10/01/2022   BMI 23.57 kg/m    Body mass index is 23.57 kg/m .  Physical Exam  Chest:   Breasts:     Breasts are symmetrical.      Right: Skin change present. No swelling, bleeding, inverted nipple, mass, nipple discharge or tenderness.      Left: Normal. No swelling, bleeding, inverted nipple, mass, nipple discharge, skin change or tenderness.       Lymphadenopathy:      Upper Body:      Right upper body: No axillary adenopathy.      Left upper body: No axillary adenopathy.                            "

## 2022-11-15 ENCOUNTER — ANCILLARY PROCEDURE (OUTPATIENT)
Dept: ULTRASOUND IMAGING | Facility: CLINIC | Age: 37
End: 2022-11-15
Attending: OBSTETRICS & GYNECOLOGY
Payer: COMMERCIAL

## 2022-11-15 DIAGNOSIS — N83.202 LEFT OVARIAN CYST: ICD-10-CM

## 2022-11-15 PROCEDURE — 76856 US EXAM PELVIC COMPLETE: CPT | Performed by: OBSTETRICS & GYNECOLOGY

## 2022-11-15 PROCEDURE — 76830 TRANSVAGINAL US NON-OB: CPT | Performed by: OBSTETRICS & GYNECOLOGY

## 2023-01-14 ENCOUNTER — HEALTH MAINTENANCE LETTER (OUTPATIENT)
Age: 38
End: 2023-01-14

## 2023-02-16 DIAGNOSIS — F32.81 PMDD (PREMENSTRUAL DYSPHORIC DISORDER): ICD-10-CM

## 2023-02-16 NOTE — TELEPHONE ENCOUNTER
"Requested Prescriptions   Pending Prescriptions Disp Refills     sertraline (ZOLOFT) 50 MG tablet [Pharmacy Med Name: SERTRALINE 50MG TABLETS] 90 tablet 3     Sig: TAKE 1 TABLET BY MOUTH DAILY       SSRIs Protocol Failed - 2/16/2023  8:03 AM        Failed - PHQ-9 score less than 5 in past 6 months     Please review last PHQ-9 score.           Failed - Recent (6 mo) or future (30 days) visit within the authorizing provider's specialty     Patient had office visit in the last 6 months or has a visit in the next 30 days with authorizing provider or within the authorizing provider's specialty.  See \"Patient Info\" tab in inbasket, or \"Choose Columns\" in Meds & Orders section of the refill encounter.            Passed - Medication is active on med list        Passed - Patient is age 18 or older        Passed - No active pregnancy on record        Passed - No positive pregnancy test in last 12 months           Last Written Prescription Date:  12/8/21  Last Fill Quantity: 90,  # refills: 3   Last office visit: 12/8/2021 with prescribing provider:  Dr Santoyo   Future Office Visit:      This dose is 50mg and recent Rx was for 100mg - this dose was extended back on 6/22/22 for only 30 tabs. This is a break in care.  Rx denied - needs apt.    Wendy Navas RN on 2/16/2023 at 10:07 AM      "

## 2023-03-23 ENCOUNTER — OFFICE VISIT (OUTPATIENT)
Dept: OBGYN | Facility: CLINIC | Age: 38
End: 2023-03-23
Payer: COMMERCIAL

## 2023-03-23 VITALS
DIASTOLIC BLOOD PRESSURE: 66 MMHG | SYSTOLIC BLOOD PRESSURE: 120 MMHG | HEIGHT: 66 IN | WEIGHT: 156 LBS | BODY MASS INDEX: 25.07 KG/M2 | HEART RATE: 84 BPM

## 2023-03-23 DIAGNOSIS — N92.6 IRREGULAR BLEEDING: ICD-10-CM

## 2023-03-23 DIAGNOSIS — Z01.419 WOMEN'S ANNUAL ROUTINE GYNECOLOGICAL EXAMINATION: Primary | ICD-10-CM

## 2023-03-23 DIAGNOSIS — F32.81 PMDD (PREMENSTRUAL DYSPHORIC DISORDER): ICD-10-CM

## 2023-03-23 PROCEDURE — 99395 PREV VISIT EST AGE 18-39: CPT | Performed by: OBSTETRICS & GYNECOLOGY

## 2023-03-23 RX ORDER — SERTRALINE HYDROCHLORIDE 100 MG/1
100 TABLET, FILM COATED ORAL DAILY
Qty: 90 TABLET | Refills: 3 | Status: SHIPPED | OUTPATIENT
Start: 2023-03-23 | End: 2023-09-13

## 2023-03-23 RX ORDER — FLUOXETINE 10 MG/1
CAPSULE ORAL
Qty: 30 CAPSULE | Refills: 6 | Status: SHIPPED | OUTPATIENT
Start: 2023-03-23

## 2023-03-23 RX ORDER — ETONOGESTREL AND ETHINYL ESTRADIOL VAGINAL RING .015; .12 MG/D; MG/D
1 RING VAGINAL
Qty: 3 EACH | Refills: 3 | Status: SHIPPED | OUTPATIENT
Start: 2023-03-23

## 2023-03-23 NOTE — NURSING NOTE
"Chief Complaint   Patient presents with     Gyn Exam     Breast and pelvic exam. Pap/HPV are up to date.        Initial /66   Pulse 84   Ht 1.676 m (5' 6\")   Wt 70.8 kg (156 lb)   LMP 2023   BMI 25.18 kg/m   Estimated body mass index is 25.18 kg/m  as calculated from the following:    Height as of this encounter: 1.676 m (5' 6\").    Weight as of this encounter: 70.8 kg (156 lb).  BP completed using cuff size: regular    Questioned patient about current smoking habits.  Pt. has never smoked.          The following HM Due: NONE      The following patient reported/Care Every where data was sent to:  P ABSTRACT QUALITY INITIATIVES [[id number]]  Janeth Prescott LPN               "

## 2023-03-23 NOTE — PROGRESS NOTES
SUBJECTIVE:                                                      Sarahi is a 38 year old  female who presents for annual exam.     Patient's last menstrual period was 2023.. Menses are generally regular on oral contraceptive pills for PMDD, but the last 3 months she has had spotting in the 3rd week of the pill pack. Overall this has worked really well, but it's hard to know when to add the Prozac for PMDD when she is having irregular bleeding. She does seem to have symptoms even if not on the placebo week of pills. She did try continuous use before, but had breakthrough bleeding.  Using vasectomy for contraception.  She is not currently considering pregnancy.  Besides routine health maintenance,  she would like to discuss options for switching her oral contraceptive pills. Needs refills for Zoloft, Prozac. Stable on these meds. If any changes needed for her Zoloft, would recommend primary care provider for that.  GYNECOLOGIC HISTORY:    Sarahi is sexually active with 1 male partner(s) and is currently in a monogamous relationship.      History sexually transmitted infections:No STD history    History of abnormal Pap smear: YES - updated in Problem List and Health Maintenance accordingly. No LEEP, recently all paps nl.  Family history of breast CA: Yes (Please explain): mat great aunt   Family history of uterine/ovarian CA: No    Family history of colon CA: No      HISTORY:  OB History    Para Term  AB Living   2 2 2 0 0 2   SAB IAB Ectopic Multiple Live Births   0 0 0 0 2      # Outcome Date GA Lbr Jose Antonio/2nd Weight Sex Delivery Anes PTL Lv   2 Term 17 39w4d 01:50 / 00:23 3.55 kg (7 lb 13.2 oz) F Vag-Spont EPI N RADHA      Name: HOPKINSYOVANNYMEHULMACO      Apgar1: 8  Apgar5: 9   1 Term 13 38w4d 07:50 / 02:23 3.54 kg (7 lb 12.9 oz) M Vag-Spont EPI N RADHA      Name: Tyler      Apgar1: 9  Apgar5: 9     Past Medical History:   Diagnosis Date     Abnormal Pap smear     most recent normal      Asthma     excercise induced     Asthma 5/2/2011    Problem list name updated by automated process. Provider to review     Diabetes (H)     GESTATIONAL/DIET     Environmental allergies     using zyrtec     Mental disorder     on meds     Moderate persistent asthma 7/6/2011     Past Surgical History:   Procedure Laterality Date     ABDOMINOPLASTY  05/2022     DENTAL SURGERY  01/01/2003    wisdom teeth     HC AMNIOCENTESIS DIAGNOSTIC  12/07/2016     MO FACE LIFT & NECK  11/2022    Neck lift     Family History   Problem Relation Age of Onset     Asthma Paternal Grandfather      Allergies Paternal Grandfather      Allergies Paternal Grandmother      Allergies Father      Thyroid Disease Mother         both hypo and hyper?     Hypertension Mother         PIH     Allergies Brother      Breast Cancer Other         maternal great-aunt     Social History     Socioeconomic History     Marital status:      Spouse name: None     Number of children: 1     Years of education: None     Highest education level: None   Occupational History     Occupation: law student     Employer: [employer]     Employer: [employer]   Tobacco Use     Smoking status: Never     Smokeless tobacco: Never   Substance and Sexual Activity     Alcohol use: Yes     Alcohol/week: 5.0 standard drinks     Types: 5 Standard drinks or equivalent per week     Comment: socially - not during pregnancy     Drug use: No     Sexual activity: Yes     Partners: Male     Birth control/protection: Condom, OCP   Other Topics Concern     Parent/sibling w/ CABG, MI or angioplasty before 65F 55M? No   Social History Narrative    , 1 son    Employed as an  -  for [employer]        Current Outpatient Medications:      cetirizine (ZYRTEC) 10 MG tablet, Take 1 tablet by mouth every evening., Disp: 30 tablet, Rfl: 1     Cholecalciferol (VITAMIN D3 PO), Take by mouth daily, Disp: , Rfl:      FLUoxetine (PROZAC) 10 MG capsule, Take one daily  "starting on day 15 of the cycle through the first day of period., Disp: 30 capsule, Rfl: 6     levonorgestrel-ethinyl estradiol (AVIANE) 0.1-20 MG-MCG tablet, Take 1 tablet by mouth daily, Disp: 84 tablet, Rfl: 3     sertraline (ZOLOFT) 100 MG tablet, Take 1 tablet (100 mg) by mouth daily, Disp: 30 tablet, Rfl: 3   No Known Allergies    Past medical, surgical, social and family history were reviewed and updated in EPIC.    ROS:   Negative other than as noted above.      OBJECTIVE:                                                      EXAM:  /66   Pulse 84   Ht 1.676 m (5' 6\")   Wt 70.8 kg (156 lb)   LMP 03/20/2023   BMI 25.18 kg/m     BMI: Body mass index is 25.18 kg/m .  General: Alert and oriented, no distress.  Psychiatric: Mood and affect within normal limits.  Skin: Warm and dry, no lesions, rashes or discolorations.  Breasts:  Symmetric, no skin changes.  No dominant masses bilaterally.   Lymph:  No cervical, supraclavicular, infraclavicular, axillary or inguinal lymphadenopathy palpable.   Abdomen: Soft, nontender, no hepatosplenomegaly, no rebound or guarding, no masses, no hernias.   Vulva:  No external lesions, normal female hair distribution, no inguinal adenopathy.    Urethra:  Midline, non-tender, well supported, no discharge  Vagina:  Well-estrogenized, no abnormal discharge, no lesions  Cervix: no lesions, no discharge  Uterus:  anteverted, smooth contour, without enlargement, mobile, and without tenderness  Ovaries:  No masses appreciated, non-tender, mobile  Rectal Exam: deferred  Musculoskeletal: extremities normal      COUNSELING:   Reviewed preventive health counseling, as reflected in patient instructions   reports that she has never smoked. She has never used smokeless tobacco.        ASSESSMENT/PLAN:                                                      38 year old female with satisfactory annual exam  (Z01.419) Women's annual routine gynecological examination  (primary encounter " diagnosis)  Comment:   Plan: etonogestrel-ethinyl estradiol (NUVARING)         0.12-0.015 MG/24HR vaginal ring        Given breakthrough bleeding on multiple different oral contraceptives, we will try switching to the NuvaRing to see if that provides more stable levels of hormones and therefore a more predictable bleeding pattern.  If she has breakthrough bleeding on this, we will consider pelvic ultrasound and labs.    (F32.81) PMDD (premenstrual dysphoric disorder)  Comment:   Plan: FLUoxetine (PROZAC) 10 MG capsule, sertraline         (ZOLOFT) 100 MG tablet, etonogestrel-ethinyl         estradiol (NUVARING) 0.12-0.015 MG/24HR vaginal        ring        Prozac for her PMDD has been working very well as long as she can time it to when her cycle starts.  Renewal sent today.  She is stable in terms of her history of depression and anxiety with Zoloft, so that was renewed today as well.    (N92.6) Irregular bleeding  Comment:   Plan: etonogestrel-ethinyl estradiol (NUVARING)         0.12-0.015 MG/24HR vaginal ring              Elba Agnel MD

## 2023-04-14 ENCOUNTER — OFFICE VISIT (OUTPATIENT)
Dept: DERMATOLOGY | Facility: CLINIC | Age: 38
End: 2023-04-14
Attending: OBSTETRICS & GYNECOLOGY
Payer: COMMERCIAL

## 2023-04-14 DIAGNOSIS — D48.5 NEOPLASM OF UNCERTAIN BEHAVIOR OF SKIN: ICD-10-CM

## 2023-04-14 DIAGNOSIS — L82.1 SEBORRHEIC KERATOSIS: ICD-10-CM

## 2023-04-14 DIAGNOSIS — D22.9 NEVUS: Primary | ICD-10-CM

## 2023-04-14 DIAGNOSIS — D18.01 ANGIOMA OF SKIN: ICD-10-CM

## 2023-04-14 DIAGNOSIS — L81.4 LENTIGO: ICD-10-CM

## 2023-04-14 PROCEDURE — 88305 TISSUE EXAM BY PATHOLOGIST: CPT | Performed by: PATHOLOGY

## 2023-04-14 PROCEDURE — 99203 OFFICE O/P NEW LOW 30 MIN: CPT | Mod: 25 | Performed by: PHYSICIAN ASSISTANT

## 2023-04-14 PROCEDURE — 11102 TANGNTL BX SKIN SINGLE LES: CPT | Performed by: PHYSICIAN ASSISTANT

## 2023-04-14 PROCEDURE — 88342 IMHCHEM/IMCYTCHM 1ST ANTB: CPT | Performed by: PATHOLOGY

## 2023-04-14 PROCEDURE — 88341 IMHCHEM/IMCYTCHM EA ADD ANTB: CPT | Performed by: PATHOLOGY

## 2023-04-14 NOTE — PATIENT INSTRUCTIONS
Wound Care Instructions     FOR SUPERFICIAL WOUNDS     [facility]  [phone]                 AFTER 24 HOURS YOU SHOULD REMOVE THE BANDAGE AND BEGIN DAILY DRESSING CHANGES AS FOLLOWS:     1) Remove Dressing.     2) Clean and dry the area with tap water using a Q-tip or sterile gauze pad.     3) Apply Vaseline, Aquaphor, Polysporin ointment or Bacitracin ointment over entire wound.  Do NOT use Neosporin ointment.     4) Cover the wound with a band-aid, or a sterile non-stick gauze pad and micropore paper tape    REPEAT THESE INSTRUCTIONS AT LEAST ONCE A DAY UNTIL THE WOUND HAS COMPLETELY HEALED.    It is an old wives tale that a wound heals better when it is exposed to air and allowed to dry out. The wound will heal faster with a better cosmetic result if it is kept moist with ointment and covered with a bandage.    **Do not let the wound dry out.**    Supplies Needed:      *Cotton tipped applicators (Q-tips)    *Vaseline, Aquaphor, Polysporin or Bacitracin Ointment (NOT NEOSPORIN)    *Band-aids or non-stick gauze pads and micropore paper tape.      PATIENT INFORMATION:    During the healing process you will notice a number of changes. All wounds develop a small halo of redness surrounding the wound.  This means healing is occurring. Severe itching with extensive redness usually indicates sensitivity to the ointment or bandage tape used to dress the wound.  You should call our office if this develops.      Swelling  and/or discoloration around your surgical site is common, particularly when performed around the eye.    All wounds normally drain.  The larger the wound the more drainage there will be.  After 7-10 days, you will notice the wound beginning to shrink and new skin will begin to grow.  The wound is healed when you can see skin has formed over the entire area.  A healed wound has a healthy, shiny look to the surface and is red to dark pink in color to normalize.  Wounds may  take approximately 4-6 weeks to heal.  Larger wounds may take 6-8 weeks.  After the wound is healed you may discontinue dressing changes.    You may experience a sensation of tightness as your wound heals. This is normal and will gradually subside.    Your healed wound may be sensitive to temperature changes. This sensitivity improves with time, but if you re having a lot of discomfort, try to avoid temperature extremes.    Patients frequently experience itching after their wound appears to have healed because of the continue healing under the skin.  Plain Vaseline will help relieve the itching.      POSSIBLE COMPLICATIONS    BLEEDING:    Leave the bandage in place.  Use tightly rolled up gauze or a cloth to apply direct pressure over the bandage for 30  minutes.  Reapply pressure for an additional 30 minutes if necessary  Use additional gauze and tape to maintain pressure once the bleeding has stopped.

## 2023-04-14 NOTE — PROGRESS NOTES
HPI:   Chief complaints: Sarahi Gates is a pleasant 38 year old female who presents for Full skin cancer screening to rule out skin cancer   Last Skin Exam: n/a      1st Baseline: yes  Personal HX of Skin Cancer: no   Personal HX of Malignant Melanoma: no   Family HX of Skin Cancer / Malignant Melanoma: grandparents with skin cancer; unsure which type. Father with a precancerous lesion  Personal HX of Atypical Moles:   no  Risk factors: history of sun exposure and burns  New / Changing lesions: yes spot on the breast  Social History: two children ages 6 and 9. Just got new baby chicks  On review of systems, there are no further skin complaints, patient is feeling otherwise well.   ROS of the following were done and are negative: Constitutional, Eyes, Ears, Nose,   Mouth, Throat, Cardiovascular, Respiratory, GI, Genitourinary, Musculoskeletal,   Psychiatric, Endocrine, Allergic/Immunologic.    PHYSICAL EXAM:   LMP 03/20/2023   Skin exam performed as follows: Type 2 skin. Mood appropriate  Alert and Oriented X 3. Well developed, well nourished in no distress.  General appearance: Normal  Head including face: Normal  Eyes: conjunctiva and lids: Normal  Mouth: Lips, teeth, gums: Normal  Neck: Normal  Chest-breast/axillae: Normal  Back: Normal  Extremities: digits/nails (clubbing): Normal  Eccrine and Apocrine glands: Normal  Right upper extremity: Normal  Left upper extremity: Normal  Right lower extremity: Normal  Left lower extremity: Normal  Skin: Scalp and body hair: See below    Pt deferred exam of breasts, groin, buttocks: No    Other physical findings:  1. Multiple pigmented macules on extremities and trunk  2. Multiple pigmented macules on face, trunk and extremities  3. Multiple vascular papules on trunk, arms and legs  4. Multiple scattered keratotic plaques  5. 3 mm pink brown macule on the left calf       Except as noted above, no other signs of skin cancer or melanoma.     ASSESSMENT/PLAN:   Benign Full  skin cancer screening today. . Patient with history of none  Advised on monthly self exams and 1 year  Patient Education: Appropriate brochures given.    1. Multiple benign appearing melanocytic nevi on arms, legs and trunk. Discussed ABCDEs of melanoma and sunscreen. Makes > 100 nevi - recommend a yearly FSE  2. Multiple lentigos on arms, legs and trunk. Advised benign, no treatment needed.  3. Multiple scattered angiomas. Advised benign, no treatment needed.   4. Seborrheic keratosis on arms, legs and trunk. Advised benign, no treatment needed.  5. Atypical nevus on the left calf. Shave biopsy performed.  Area cleaned and anesthetized with 1% lidocaine with epinephrine.  Dermablade used to remove the lesion and sent to pathology. Bleeding was cauterized. Pt tolerated procedure well with no complications.             Follow-up: yearly FSE/PRN sooner    1.) Patient was asked about new and changing moles. YES  2.) Patient received a complete physical skin examination: YES  3.) Patient was counseled to perform a monthly self skin examination: YES  Scribed By: Kassie Quinn, MS, PA-C

## 2023-04-14 NOTE — LETTER
4/14/2023         RE: Sarahi Gates  [address] [facility] [address]        Dear Colleague,    Thank you for referring your patient, Sarahi Gates, to the [facility]. Please see a copy of my visit note below.    HPI:   Chief complaints: Sarahi Gates is a pleasant 38 year old female who presents for Full skin cancer screening to rule out skin cancer   Last Skin Exam: n/a      1st Baseline: yes  Personal HX of Skin Cancer: no   Personal HX of Malignant Melanoma: no   Family HX of Skin Cancer / Malignant Melanoma: grandparents with skin cancer; unsure which type. Father with a precancerous lesion  Personal HX of Atypical Moles:   no  Risk factors: history of sun exposure and burns  New / Changing lesions: yes spot on the breast  Social History: two children ages 6 and 9. Just got new baby chicks  On review of systems, there are no further skin complaints, patient is feeling otherwise well.   ROS of the following were done and are negative: Constitutional, Eyes, Ears, Nose,   Mouth, Throat, Cardiovascular, Respiratory, GI, Genitourinary, Musculoskeletal,   Psychiatric, Endocrine, Allergic/Immunologic.    PHYSICAL EXAM:   LMP 03/20/2023   Skin exam performed as follows: Type 2 skin. Mood appropriate  Alert and Oriented X 3. Well developed, well nourished in no distress.  General appearance: Normal  Head including face: Normal  Eyes: conjunctiva and lids: Normal  Mouth: Lips, teeth, gums: Normal  Neck: Normal  Chest-breast/axillae: Normal  Back: Normal  Extremities: digits/nails (clubbing): Normal  Eccrine and Apocrine glands: Normal  Right upper extremity: Normal  Left upper extremity: Normal  Right lower extremity: Normal  Left lower extremity: Normal  Skin: Scalp and body hair: See below    Pt deferred exam of breasts, groin, buttocks: No    Other physical findings:  1. Multiple pigmented macules on extremities and trunk  2. Multiple pigmented macules on face, trunk  and extremities  3. Multiple vascular papules on trunk, arms and legs  4. Multiple scattered keratotic plaques  5. 3 mm pink brown macule on the left calf       Except as noted above, no other signs of skin cancer or melanoma.     ASSESSMENT/PLAN:   Benign Full skin cancer screening today. . Patient with history of none  Advised on monthly self exams and 1 year  Patient Education: Appropriate brochures given.    1. Multiple benign appearing melanocytic nevi on arms, legs and trunk. Discussed ABCDEs of melanoma and sunscreen. Makes > 100 nevi - recommend a yearly FSE  2. Multiple lentigos on arms, legs and trunk. Advised benign, no treatment needed.  3. Multiple scattered angiomas. Advised benign, no treatment needed.   4. Seborrheic keratosis on arms, legs and trunk. Advised benign, no treatment needed.  5. Atypical nevus on the left calf. Shave biopsy performed.  Area cleaned and anesthetized with 1% lidocaine with epinephrine.  Dermablade used to remove the lesion and sent to pathology. Bleeding was cauterized. Pt tolerated procedure well with no complications.             Follow-up: yearly FSE/PRN sooner    1.) Patient was asked about new and changing moles. YES  2.) Patient received a complete physical skin examination: YES  3.) Patient was counseled to perform a monthly self skin examination: YES  Scribed By: Kassie Quinn, MS, PAJOSE ARMANDO          Again, thank you for allowing me to participate in the care of your patient.        Sincerely,        Kassie Quinn PA-C

## 2023-04-19 LAB
PATH REPORT.COMMENTS IMP SPEC: NORMAL
PATH REPORT.FINAL DX SPEC: NORMAL
PATH REPORT.GROSS SPEC: NORMAL
PATH REPORT.MICROSCOPIC SPEC OTHER STN: NORMAL
PATH REPORT.RELEVANT HX SPEC: NORMAL

## 2023-04-20 ENCOUNTER — TELEPHONE (OUTPATIENT)
Dept: DERMATOLOGY | Facility: CLINIC | Age: 38
End: 2023-04-20
Payer: COMMERCIAL

## 2023-04-20 NOTE — TELEPHONE ENCOUNTER
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

## 2023-04-20 NOTE — TELEPHONE ENCOUNTER
Left message on answering machine for patient to call back.    Belkis DOMINGUEZ RN  Dermatology   [phone]

## 2023-04-20 NOTE — TELEPHONE ENCOUNTER
Called patient:  Patient notified and educated on test results   Mohs procedure explained- all questions answered  appointment scheduled- mohs packet mailed.     Thank you,  Belkis DOMINGUEZ RN  Dermatology   [phone]

## 2023-04-25 NOTE — PROGRESS NOTES
Surgical Office Location:  [facility]  [address] [place] [id number]

## 2023-04-27 ENCOUNTER — OFFICE VISIT (OUTPATIENT)
Dept: DERMATOLOGY | Facility: CLINIC | Age: 38
End: 2023-04-27
Payer: COMMERCIAL

## 2023-04-27 DIAGNOSIS — D03.72 MELANOMA IN SITU OF LEFT LOWER LEG (H): Primary | ICD-10-CM

## 2023-04-27 PROCEDURE — 17313 MOHS 1 STAGE T/A/L: CPT | Performed by: DERMATOLOGY

## 2023-04-27 PROCEDURE — 88342 IMHCHEM/IMCYTCHM 1ST ANTB: CPT | Mod: 59 | Performed by: DERMATOLOGY

## 2023-04-27 NOTE — PATIENT INSTRUCTIONS
Open Wound Care     for ______________        No strenuous activity for 48 hours    Take Tylenol as needed for discomfort.                                                .         Do not drink alcoholic beverages for 48 hours.    Keep the pressure bandage in place for 24 hours. If the bandage becomes blood tinged or loose, reinforce it with gauze and tape.        (Refer to the reverse side of this page for management of bleeding).    Remove bandage in 24 hours and begin wound care as follows:     Clean area with tap water using a Q tip or gauze pad, (shower / bathe normally)  Dry wound with Q tip or gauze pad  Apply Aquaphor, Vaseline, Polysporin or Bacitracin Ointment with a Q tip  Do NOT use Neosporin Ointment *  Cover the wound with a band-aid or nonstick gauze pad and paper tape.  Repeat wound care once a day until wound is completely healed.    It is an old wives tale that a wound heals better when it is exposed to air and allowed to dry out. The wound will heal faster with a better cosmetic result if it is kept moist with ointment and covered with a bandage.  Do not let the wound dry out.      Supplies Needed:                Qtips or gauze pads                Polysporin or Bacitracin Ointment                Bandaids or nonstick gauze pads and paper tape    Wound care kits and brown paper tape are available for purchase at   the pharmacy.    BLEEDING:    Use tightly rolled up gauze or cloth to apply direct pressure over the bandage for 20   minutes.  Reapply pressure for an additional 20 minutes if necessary  Call the office or go to the nearest emergency room if pressure fails to stop the bleeding.  Use additional gauze and tape to maintain pressure once the bleeding has stopped.  Begin wound care 24 hours after surgery as directed.                  WOUND HEALING    One week after surgery a pink / red halo will form around the outside of the wound.   This is new skin.  The center of the wound will appear  yellowish white and produce some drainage.  The pink halo will slowly migrate in toward the center of the wound until the wound is covered with new shiny pink skin.  There will be no more drainage when the wound is completely healed.  It will take six months to one year for the redness to fade.  The scar may be itchy, tight and sensitive to extreme temperatures for a year after the surgery.  Massaging the area several times a day for several minutes after the wound is completely healed will help the scar soften and normalize faster. Begin massage only after healing is complete.      In case of emergency call: Dr Porras: [phone]    [facility]: [phone]    [facility]:[phone]

## 2023-04-27 NOTE — LETTER
4/27/2023         RE: Sarahi Gates  [address]  [facility] [address]        Dear Colleague,    Thank you for referring your patient, Sarahi Gates, to the [facility]. Please see a copy of my visit note below.    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  Patient has no other skin complaints today.  Remainder of the HPI, Meds, PMH, Allergies, FH, and SH was reviewed in chart.      Past Medical History:   Diagnosis Date     Abnormal Pap smear     most recent normal     Asthma 05/02/2011    Problem list name updated by automated process. Provider to review     Environmental allergies     using zyrtec     History of gestational diabetes      Mental disorder     on meds     Moderate persistent asthma 07/06/2011       Past Surgical History:   Procedure Laterality Date     ABDOMINOPLASTY  05/2022     DENTAL SURGERY  01/01/2003    wisdom teeth     CA FACE LIFT & NECK  11/2022    Neck lift        Family History   Problem Relation Age of Onset     Thyroid Disease Mother         both hypo and hyper?     Hypertension Mother         PIH     Allergies Father      Allergies Brother      Skin Cancer Maternal Grandfather      Allergies Paternal Grandmother      Asthma Paternal Grandfather      Allergies Paternal Grandfather      Skin Cancer Paternal Grandfather      Breast Cancer Other         maternal great-aunt       Social History     Socioeconomic History     Marital status:      Spouse name: Not on file     Number of children: 1     Years of education: Not on file     Highest education level: Not on file   Occupational History     Occupation: law student     Employer: [employer]     Employer: [employer]   Tobacco Use     Smoking status: Never      Smokeless tobacco: Never   Vaping Use     Vaping status: Not on file   Substance and Sexual Activity     Alcohol use: Yes     Alcohol/week: 5.0 standard drinks of alcohol     Types: 5 Standard drinks or equivalent per week     Comment: socially - not during pregnancy     Drug use: No     Sexual activity: Yes     Partners: Male     Birth control/protection: Condom, OCP   Other Topics Concern     Parent/sibling w/ CABG, MI or angioplasty before 65F 55M? No   Social History Narrative    , 1 son    Employed as an  -  for [employer]      Social Determinants of Health     Financial Resource Strain: Not on file   Food Insecurity: Not on file   Transportation Needs: Not on file   Physical Activity: Not on file   Stress: Not on file   Social Connections: Not on file   Intimate Partner Violence: Not on file   Housing Stability: Not on file       Outpatient Encounter Medications as of 4/27/2023   Medication Sig Dispense Refill     cetirizine (ZYRTEC) 10 MG tablet Take 1 tablet by mouth every evening. 30 tablet 1     Cholecalciferol (VITAMIN D3 PO) Take by mouth daily       etonogestrel-ethinyl estradiol (NUVARING) 0.12-0.015 MG/24HR vaginal ring Place 1 each vaginally every 28 days 3 each 3     FLUoxetine (PROZAC) 10 MG capsule Take one daily starting on day 15 of the cycle through the first day of period. 30 capsule 6     sertraline (ZOLOFT) 100 MG tablet Take 1 tablet (100 mg) by mouth daily 90 tablet 3     No facility-administered encounter medications on file as of 4/27/2023.             O:   NAD, WDWN, Alert & Oriented, Mood & Affect wnl, Vitals stable   Here today alone   General appearance normal   Vitals stable   Alert, oriented and in no acute distress      Following lymph nodes palpated: popliteal no lad   L calf 3mm red macule  Eyes: Conjunctivae/lids:Normal     ENT: Lips, buccal mucosa, tongue: normal    MSK:Normal    Cardiovascular: peripheral edema none    Pulm: Breathing  Normal    Neuro/Psych: Orientation:Alert and Orientedx3 ; Mood/Affect:normal       A/P:  1. Melanoma in situ   MELANOMA DISCUSSED WITH PATIENT:  I discussed the specifics of tumor, prognosis, metachronous melanoma, self exam, and genetics with the patient. I explained the need for monthly skin exams including and taught the patient how to do this. Patient was asked about new or changing moles . I discussed with patient signs and symptoms that could arise in the setting of recurrent locoregional or metastatic disease. In addition, the need to undergo every 6 month dermatologic full skin survey and evaluation given that patients with a diagnosis of melanoma are at risk of recurrence (local and distant) and of subsequent de dony melanoma.  . I reviewed treatment options, including a discussion of wide excision (the gold standard) versus Mohs surgery with MART-1 immunostains.     Note: MART-1 (Melanoma Antigen Recognized by T-cells) antibody immunostaining was used during Mohs surgery as per standard protocol, in addition to routine processing of all specimens with hematoxylin and eosin. The peripheral margins/edges were processed with the MART-1 stain (1 specimens total). The center was examined with hematoxylin & eosin and MART-1 immunostains. The patient was informed of the procedure and its risk/benefits during the consent for the procedure.    One or more of the reagents used in immunohistochemical testing in this case may not have been cleared or approved by the U.S. Food and Drug Administration (FDA). The FDA has determined that such clearance or approval is not necessary. These tests are used for clinical purposes. They should not be regarded as investigational or for research. These reagents  performance characteristics have been determined by [facility]. This laboratory is certified under the Clinical Laboratory Improvement Amendments of 1988 (CLIA-88) as qualified to perform high complexity  clinical laboratory testing.      MOHS:   Aggressive histology    The rationale for Mohs surgery was discussed with the patient and consent was obtained.  The risks and benefits as well as alternatives to therapy were discussed, in detail.  Specifically, the risks of infection, scarring, bleeding, prolonged wound healing, incomplete removal, allergy to anesthesia, nerve injury and recurrence were addressed.  Indication for Mohs was Aggressive histology. Prior to the procedure, the treatment site was clearly identified and, if available, confirmed with previous photos and confirmed by the patient   All components of the Universal Protocol/PAUSE rule were completed.  The Mohs surgeon operated in two distinct and integrated capacities as the surgeon and pathologist.      The area was prepped with Betasept.  A rim of normal appearing skin was marked circumferentially around the lesion.  The area was infiltrated with local anesthesia.  The tumor was first debulked to remove all clinically apparent tumor.  An incision following the standard Mohs approach was done and the specimen was oriented,mapped and placed in 1 block(s).  Each specimen was then chromacoded and processed in the Mohs laboratory using standard Mohs technique and submitted for frozen section histology.  Frozen section analysis showed no residual tumor but CLEAR MARGINS.      The tumor was excised using standard Mohs technique in 1 stages(s).  MART 1 stains were performed on 1 specimens. CLEAR MARGINS OBTAINED and Final defect size was 1.2 cm.     We discussed the options for wound management in full with the patient including risks/benefits/ possible outcomes.    REPAIR SECOND INTENT: We discussed the options for wound management in full with the patient including risks/benefits/possible outcomes. Decision made to allow the wound to heal by second intention. Cautery was used for for hemostasis. EBL minimal; complications none; wound care routine.  The  patient was discharged in good condition and will return in one month or prn for wound evaluation.  It was a pleasure speaking to Sarahi Gates today.  Previous clinic notes and pertinent laboratory tests were reviewed prior to Sarahi Gates's visit.  Signs and Symptoms of skin cancer discussed with patient.  Patient encouraged to perform monthly skin exams.  UV precautions reviewed with patient.  Return to clinic 6 months        Again, thank you for allowing me to participate in the care of your patient.        Sincerely,        Dequan Porras MD

## 2023-04-27 NOTE — PROGRESS NOTES
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  Patient has no other skin complaints today.  Remainder of the HPI, Meds, PMH, Allergies, FH, and SH was reviewed in chart.      Past Medical History:   Diagnosis Date     Abnormal Pap smear     most recent normal     Asthma 05/02/2011    Problem list name updated by automated process. Provider to review     Environmental allergies     using zyrtec     History of gestational diabetes      Mental disorder     on meds     Moderate persistent asthma 07/06/2011       Past Surgical History:   Procedure Laterality Date     ABDOMINOPLASTY  05/2022     DENTAL SURGERY  01/01/2003    wisdom teeth     MI FACE LIFT & NECK  11/2022    Neck lift        Family History   Problem Relation Age of Onset     Thyroid Disease Mother         both hypo and hyper?     Hypertension Mother         PIH     Allergies Father      Allergies Brother      Skin Cancer Maternal Grandfather      Allergies Paternal Grandmother      Asthma Paternal Grandfather      Allergies Paternal Grandfather      Skin Cancer Paternal Grandfather      Breast Cancer Other         maternal great-aunt       Social History     Socioeconomic History     Marital status:      Spouse name: Not on file     Number of children: 1     Years of education: Not on file     Highest education level: Not on file   Occupational History     Occupation: law student     Employer: [employer]     Employer: [employer]   Tobacco Use     Smoking status: Never     Smokeless tobacco: Never   Vaping Use     Vaping status: Not on file   Substance and Sexual Activity     Alcohol use: Yes     Alcohol/week: 5.0 standard drinks of alcohol     Types: 5 Standard drinks or equivalent per week     Comment: socially - not during pregnancy     Drug use: No     Sexual activity: Yes     Partners: Male     Birth control/protection: Condom,  OCP   Other Topics Concern     Parent/sibling w/ CABG, MI or angioplasty before 65F 55M? No   Social History Narrative    , 1 son    Employed as an  -  for [employer]      Social Determinants of Health     Financial Resource Strain: Not on file   Food Insecurity: Not on file   Transportation Needs: Not on file   Physical Activity: Not on file   Stress: Not on file   Social Connections: Not on file   Intimate Partner Violence: Not on file   Housing Stability: Not on file       Outpatient Encounter Medications as of 4/27/2023   Medication Sig Dispense Refill     cetirizine (ZYRTEC) 10 MG tablet Take 1 tablet by mouth every evening. 30 tablet 1     Cholecalciferol (VITAMIN D3 PO) Take by mouth daily       etonogestrel-ethinyl estradiol (NUVARING) 0.12-0.015 MG/24HR vaginal ring Place 1 each vaginally every 28 days 3 each 3     FLUoxetine (PROZAC) 10 MG capsule Take one daily starting on day 15 of the cycle through the first day of period. 30 capsule 6     sertraline (ZOLOFT) 100 MG tablet Take 1 tablet (100 mg) by mouth daily 90 tablet 3     No facility-administered encounter medications on file as of 4/27/2023.             O:   NAD, WDWN, Alert & Oriented, Mood & Affect wnl, Vitals stable   Here today alone   General appearance normal   Vitals stable   Alert, oriented and in no acute distress      Following lymph nodes palpated: popliteal no lad   L calf 3mm red macule  Eyes: Conjunctivae/lids:Normal     ENT: Lips, buccal mucosa, tongue: normal    MSK:Normal    Cardiovascular: peripheral edema none    Pulm: Breathing Normal    Neuro/Psych: Orientation:Alert and Orientedx3 ; Mood/Affect:normal       A/P:  1. Melanoma in situ   MELANOMA DISCUSSED WITH PATIENT:  I discussed the specifics of tumor, prognosis, metachronous melanoma, self exam, and genetics with the patient. I explained the need for monthly skin exams including and taught the patient how to do this. Patient was asked about new or  changing moles . I discussed with patient signs and symptoms that could arise in the setting of recurrent locoregional or metastatic disease. In addition, the need to undergo every 6 month dermatologic full skin survey and evaluation given that patients with a diagnosis of melanoma are at risk of recurrence (local and distant) and of subsequent de dony melanoma.  . I reviewed treatment options, including a discussion of wide excision (the gold standard) versus Mohs surgery with MART-1 immunostains.     Note: MART-1 (Melanoma Antigen Recognized by T-cells) antibody immunostaining was used during Mohs surgery as per standard protocol, in addition to routine processing of all specimens with hematoxylin and eosin. The peripheral margins/edges were processed with the MART-1 stain (1 specimens total). The center was examined with hematoxylin & eosin and MART-1 immunostains. The patient was informed of the procedure and its risk/benefits during the consent for the procedure.    One or more of the reagents used in immunohistochemical testing in this case may not have been cleared or approved by the U.S. Food and Drug Administration (FDA). The FDA has determined that such clearance or approval is not necessary. These tests are used for clinical purposes. They should not be regarded as investigational or for research. These reagents  performance characteristics have been determined by [facility]. This laboratory is certified under the Clinical Laboratory Improvement Amendments of 1988 (CLIA-88) as qualified to perform high complexity clinical laboratory testing.      MOHS:   Aggressive histology    The rationale for Mohs surgery was discussed with the patient and consent was obtained.  The risks and benefits as well as alternatives to therapy were discussed, in detail.  Specifically, the risks of infection, scarring, bleeding, prolonged wound healing, incomplete removal, allergy to anesthesia, nerve injury and  recurrence were addressed.  Indication for Mohs was Aggressive histology. Prior to the procedure, the treatment site was clearly identified and, if available, confirmed with previous photos and confirmed by the patient   All components of the Universal Protocol/PAUSE rule were completed.  The Mohs surgeon operated in two distinct and integrated capacities as the surgeon and pathologist.      The area was prepped with Betasept.  A rim of normal appearing skin was marked circumferentially around the lesion.  The area was infiltrated with local anesthesia.  The tumor was first debulked to remove all clinically apparent tumor.  An incision following the standard Mohs approach was done and the specimen was oriented,mapped and placed in 1 block(s).  Each specimen was then chromacoded and processed in the Mohs laboratory using standard Mohs technique and submitted for frozen section histology.  Frozen section analysis showed no residual tumor but CLEAR MARGINS.      The tumor was excised using standard Mohs technique in 1 stages(s).  MART 1 stains were performed on 1 specimens. CLEAR MARGINS OBTAINED and Final defect size was 1.2 cm.     We discussed the options for wound management in full with the patient including risks/benefits/ possible outcomes.    REPAIR SECOND INTENT: We discussed the options for wound management in full with the patient including risks/benefits/possible outcomes. Decision made to allow the wound to heal by second intention. Cautery was used for for hemostasis. EBL minimal; complications none; wound care routine.  The patient was discharged in good condition and will return in one month or prn for wound evaluation.  It was a pleasure speaking to Sarahi Gates today.  Previous clinic notes and pertinent laboratory tests were reviewed prior to Sarahi Gates's visit.  Signs and Symptoms of skin cancer discussed with patient.  Patient encouraged to perform monthly skin exams.  UV precautions reviewed  with patient.  Return to clinic 6 months

## 2023-05-26 ENCOUNTER — MYC MEDICAL ADVICE (OUTPATIENT)
Dept: DERMATOLOGY | Facility: CLINIC | Age: 38
End: 2023-05-26
Payer: COMMERCIAL

## 2023-07-27 ENCOUNTER — VIRTUAL VISIT (OUTPATIENT)
Dept: OBGYN | Facility: CLINIC | Age: 38
End: 2023-07-27

## 2023-07-27 DIAGNOSIS — F41.9 ANXIETY: Primary | ICD-10-CM

## 2023-07-27 PROCEDURE — 99213 OFFICE O/P EST LOW 20 MIN: CPT | Mod: 95 | Performed by: OBSTETRICS & GYNECOLOGY

## 2023-07-27 RX ORDER — BUPROPION HYDROCHLORIDE 150 MG/1
150 TABLET ORAL EVERY MORNING
Qty: 90 TABLET | Refills: 3 | Status: SHIPPED | OUTPATIENT
Start: 2023-07-27

## 2023-07-27 NOTE — PROGRESS NOTES
"Sarahi Gates is a 38 year old female who is being evaluated via a billable telephone visit.      What phone number would you like to be contacted at? [phone]  How would you like to obtain your AVS? Jar[place]  Pt in [place] and provider at [facility] at time of visit.    Sarahi Gates is a 38 year old female who presents for virtual visit today for the following health issue(s):  Patient presents with:  Medication Follow-up: PMDD-sertraline-c/o stomach/digestive issues since restarting-nausea, diarrhea    Symptoms of anxiety are stable, but persistent GI issues despite having taken this previously without issues. Slightly better in last 6 weeks, but still there. Notices \"perpetually upset stomach\", which is bothering her daily.    Previously on Wellbutrin in college and did well, wondering if that would be a good alternative.  Again, she is happy with the way the Zoloft treats her anxiety, but her GI symptoms have just been too persistent for her to want to continue that.    We discussed the risk, benefits, and alternatives to changing her medications.  I think she has a good understanding.  We will send a new prescription for Wellbutrin 150 mg extended release once a day.  She will follow-up if she is having any persistent new symptoms or does not feel that this is adequately treating her anxiety.  If she is continuing to do well, since her current anxiety symptoms are under good control, I do not think a follow-up appointment is necessary in that case.      Additional information: 6:45 on the phone visit today.    Elba Angel MD  [facility]                "

## 2023-09-08 DIAGNOSIS — F32.81 PMDD (PREMENSTRUAL DYSPHORIC DISORDER): ICD-10-CM

## 2023-09-13 RX ORDER — SERTRALINE HYDROCHLORIDE 100 MG/1
100 TABLET, FILM COATED ORAL DAILY
Qty: 90 TABLET | Refills: 3 | OUTPATIENT
Start: 2023-09-13

## 2023-10-13 DIAGNOSIS — F32.81 PMDD (PREMENSTRUAL DYSPHORIC DISORDER): ICD-10-CM

## 2023-10-13 RX ORDER — SERTRALINE HYDROCHLORIDE 100 MG/1
100 TABLET, FILM COATED ORAL DAILY
Qty: 90 TABLET | Refills: 3 | Status: SHIPPED | OUTPATIENT
Start: 2023-10-13 | End: 2023-10-27

## 2023-10-13 RX ORDER — SERTRALINE HYDROCHLORIDE 100 MG/1
100 TABLET, FILM COATED ORAL DAILY
Qty: 90 TABLET | Refills: 0 | OUTPATIENT
Start: 2023-10-13

## 2023-10-13 NOTE — TELEPHONE ENCOUNTER
Elba Angel MD Costamagna, Sarita Q, RN  Caller: Unspecified (Today, 10:04 AM)  OK to refill for one year.  Elba Angel MD

## 2023-10-13 NOTE — TELEPHONE ENCOUNTER
Per OV on 7/27/23, Pt switched from Sertraline to Wellbutrin but it did not work so she went back on Sertraline. I discontinued the Sertraline before knowing that. Could you rewrite Sertraline rx please? I sent her a PHQ-9 via [facility].    KRIT Ferris

## 2023-10-27 ENCOUNTER — MYC MEDICAL ADVICE (OUTPATIENT)
Dept: OBGYN | Facility: CLINIC | Age: 38
End: 2023-10-27

## 2023-10-27 DIAGNOSIS — F32.81 PMDD (PREMENSTRUAL DYSPHORIC DISORDER): ICD-10-CM

## 2023-10-27 RX ORDER — SERTRALINE HYDROCHLORIDE 100 MG/1
100 TABLET, FILM COATED ORAL DAILY
Qty: 90 TABLET | Refills: 3 | Status: SHIPPED | OUTPATIENT
Start: 2023-10-27

## 2023-10-27 ASSESSMENT — PATIENT HEALTH QUESTIONNAIRE - PHQ9
SUM OF ALL RESPONSES TO PHQ QUESTIONS 1-9: 3
SUM OF ALL RESPONSES TO PHQ QUESTIONS 1-9: 3
10. IF YOU CHECKED OFF ANY PROBLEMS, HOW DIFFICULT HAVE THESE PROBLEMS MADE IT FOR YOU TO DO YOUR WORK, TAKE CARE OF THINGS AT HOME, OR GET ALONG WITH OTHER PEOPLE: SOMEWHAT DIFFICULT

## 2023-11-29 ENCOUNTER — OFFICE VISIT (OUTPATIENT)
Dept: DERMATOLOGY | Facility: CLINIC | Age: 38
End: 2023-11-29

## 2023-11-29 DIAGNOSIS — D22.9 MULTIPLE BENIGN NEVI: Primary | ICD-10-CM

## 2023-11-29 DIAGNOSIS — L57.8 ACTINIC SKIN DAMAGE: ICD-10-CM

## 2023-11-29 DIAGNOSIS — L81.4 LENTIGINES: ICD-10-CM

## 2023-11-29 PROCEDURE — 99213 OFFICE O/P EST LOW 20 MIN: CPT | Performed by: STUDENT IN AN ORGANIZED HEALTH CARE EDUCATION/TRAINING PROGRAM

## 2023-11-29 ASSESSMENT — PAIN SCALES - GENERAL: PAINLEVEL: NO PAIN (0)

## 2023-11-29 NOTE — LETTER
11/29/2023         RE: Sarahi Gates  [address]  [facility] [address]        Dear Colleague,    Thank you for referring your patient, Sarahi Gates, to the [facility]. Please see a copy of my visit note below.    [facility] Dermatology Note    Encounter Date: Nov 29, 2023    Dermatology Problem List:  #Hx Atypical JMP L calf 04/14/23    Major PMHx  -   ______________________________________    Impression/Plan:  Sarahi was seen today for skin check.    Diagnoses and all orders for this visit:    Multiple benign nevi  Lentigines  Actinic skin damage  - Reviewed the compounding benefits of incremental changes to sun protective clothing behaviors including increased frequency of sunscreen and sun protective clothing like broad brimmed hats and longsleeved UPF containing clothing      Follow-up in 1 year.       Staff Involved:  Staff Only    Jose Roberto Wolfe MD   of Dermatology  Department of Dermatology  [facility] [employer]      CC:   Chief Complaint   Patient presents with     Skin Check       History of Present Illness:  Ms. Sarahi Gates is a 38 year old female who presents as a return patient.    Pt presents today for concerns about spots on trunk and extremities       Labs:      Physical exam:  Vitals: Breastfeeding No   GEN: well developed, well-nourished, in no acute distress, in a pleasant mood.     SKIN: Mercedes phototype 1  - Full skin, which includes the head/face, both arms, chest, back, abdomen,both legs, genitalia and/or groin buttocks, digits and/or nails, was examined.  - Flat brown macules and patches in a sun exposed areas on face and extremities  - scattered brown papules on trunk and extremities   - No other lesions of concern on areas examined.     Past Medical History:   Past Medical History:   Diagnosis Date     Abnormal Pap smear     most recent normal     Asthma 05/02/2011     Problem list name updated by automated process. Provider to review     Environmental allergies     using zyrtec     History of gestational diabetes      Malignant melanoma (H)      Mental disorder     on meds     Moderate persistent asthma 07/06/2011     Past Surgical History:   Procedure Laterality Date     ABDOMINOPLASTY  05/2022     DENTAL SURGERY  01/01/2003    wisdom teeth     [place] FACE LIFT & NECK  11/2022    Neck lift       Social History:   reports that she has never smoked. She has never used smokeless tobacco. She reports current alcohol use of about 5.0 standard drinks of alcohol per week. She reports that she does not use drugs.    Family History:  Family History   Problem Relation Age of Onset     Thyroid Disease Mother         both hypo and hyper?     Hypertension Mother         PIH     Allergies Father      Allergies Brother      Skin Cancer Maternal Grandfather      Allergies Paternal Grandmother      Asthma Paternal Grandfather      Allergies Paternal Grandfather      Skin Cancer Paternal Grandfather      Breast Cancer Other         maternal great-aunt       Medications:  Current Outpatient Medications   Medication Sig Dispense Refill     buPROPion (WELLBUTRIN XL) 150 MG 24 hr tablet Take 1 tablet (150 mg) by mouth every morning 90 tablet 3     cetirizine (ZYRTEC) 10 MG tablet Take 1 tablet by mouth every evening. 30 tablet 1     Cholecalciferol (VITAMIN D3 PO) Take by mouth daily       etonogestrel-ethinyl estradiol (NUVARING) 0.12-0.015 MG/24HR vaginal ring Place 1 each vaginally every 28 days 3 each 3     FLUoxetine (PROZAC) 10 MG capsule Take one daily starting on day 15 of the cycle through the first day of period. 30 capsule 6     sertraline (ZOLOFT) 100 MG tablet TAKE 1 TABLET(100 MG) BY MOUTH DAILY 90 tablet 3     No Known Allergies              Again, thank you for allowing me to participate in the care of your patient.        Sincerely,        Jose Roberto Wolfe MD

## 2023-11-29 NOTE — PROGRESS NOTES
[facility] Dermatology Note    Encounter Date: Nov 29, 2023    Dermatology Problem List:  #Hx Atypical JMP L calf 04/14/23    Major PMHx  -   ______________________________________    Impression/Plan:  Sarahi was seen today for skin check.    Diagnoses and all orders for this visit:    Multiple benign nevi  Lentigines  Actinic skin damage  - Reviewed the compounding benefits of incremental changes to sun protective clothing behaviors including increased frequency of sunscreen and sun protective clothing like broad brimmed hats and longsleeved UPF containing clothing      Follow-up in 1 year.       Staff Involved:  Staff Only    Jose Roberto Wolfe MD   of Dermatology  Department of Dermatology  [facility] [employer]      CC:   Chief Complaint   Patient presents with    Skin Check       History of Present Illness:  Ms. Sarahi Gates is a 38 year old female who presents as a return patient.    Pt presents today for concerns about spots on trunk and extremities       Labs:      Physical exam:  Vitals: Breastfeeding No   GEN: well developed, well-nourished, in no acute distress, in a pleasant mood.     SKIN: Mercedes phototype 1  - Full skin, which includes the head/face, both arms, chest, back, abdomen,both legs, genitalia and/or groin buttocks, digits and/or nails, was examined.  - Flat brown macules and patches in a sun exposed areas on face and extremities  - scattered brown papules on trunk and extremities   - No other lesions of concern on areas examined.     Past Medical History:   Past Medical History:   Diagnosis Date    Abnormal Pap smear     most recent normal    Asthma 05/02/2011    Problem list name updated by automated process. Provider to review    Environmental allergies     using zyrtec    History of gestational diabetes     Malignant melanoma (H)     Mental disorder     on meds    Moderate persistent asthma 07/06/2011     Past Surgical History:    Procedure Laterality Date    ABDOMINOPLASTY  05/2022    DENTAL SURGERY  01/01/2003    wisdom teeth    [place] FACE LIFT & NECK  11/2022    Neck lift       Social History:   reports that she has never smoked. She has never used smokeless tobacco. She reports current alcohol use of about 5.0 standard drinks of alcohol per week. She reports that she does not use drugs.    Family History:  Family History   Problem Relation Age of Onset    Thyroid Disease Mother         both hypo and hyper?    Hypertension Mother         PIH    Allergies Father     Allergies Brother     Skin Cancer Maternal Grandfather     Allergies Paternal Grandmother     Asthma Paternal Grandfather     Allergies Paternal Grandfather     Skin Cancer Paternal Grandfather     Breast Cancer Other         maternal great-aunt       Medications:  Current Outpatient Medications   Medication Sig Dispense Refill    buPROPion (WELLBUTRIN XL) 150 MG 24 hr tablet Take 1 tablet (150 mg) by mouth every morning 90 tablet 3    cetirizine (ZYRTEC) 10 MG tablet Take 1 tablet by mouth every evening. 30 tablet 1    Cholecalciferol (VITAMIN D3 PO) Take by mouth daily      etonogestrel-ethinyl estradiol (NUVARING) 0.12-0.015 MG/24HR vaginal ring Place 1 each vaginally every 28 days 3 each 3    FLUoxetine (PROZAC) 10 MG capsule Take one daily starting on day 15 of the cycle through the first day of period. 30 capsule 6    sertraline (ZOLOFT) 100 MG tablet TAKE 1 TABLET(100 MG) BY MOUTH DAILY 90 tablet 3     No Known Allergies

## 2024-04-27 ENCOUNTER — HEALTH MAINTENANCE LETTER (OUTPATIENT)
Age: 39
End: 2024-04-27

## 2024-09-12 DIAGNOSIS — F32.81 PMDD (PREMENSTRUAL DYSPHORIC DISORDER): ICD-10-CM

## 2024-09-12 RX ORDER — SERTRALINE HYDROCHLORIDE 100 MG/1
100 TABLET, FILM COATED ORAL DAILY
Qty: 90 TABLET | Refills: 3 | OUTPATIENT
Start: 2024-09-12

## 2024-09-12 NOTE — TELEPHONE ENCOUNTER
[facility] [employer] sent to make appt for med check will check back.    Areli MORA RN BSN  [facility]

## 2024-10-01 PROCEDURE — 88307 TISSUE EXAM BY PATHOLOGIST: CPT | Mod: 26 | Performed by: STUDENT IN AN ORGANIZED HEALTH CARE EDUCATION/TRAINING PROGRAM

## 2024-10-01 PROCEDURE — 88307 TISSUE EXAM BY PATHOLOGIST: CPT | Mod: TC,ORL | Performed by: OBSTETRICS & GYNECOLOGY

## 2024-10-02 ENCOUNTER — LAB REQUISITION (OUTPATIENT)
Dept: LAB | Facility: CLINIC | Age: 39
End: 2024-10-02
Payer: COMMERCIAL

## 2024-10-02 DIAGNOSIS — N36.42 INTRINSIC SPHINCTER DEFICIENCY (ISD): ICD-10-CM

## 2024-10-02 DIAGNOSIS — N94.6 DYSMENORRHEA, UNSPECIFIED: ICD-10-CM

## 2024-10-02 DIAGNOSIS — N92.0 EXCESSIVE AND FREQUENT MENSTRUATION WITH REGULAR CYCLE: ICD-10-CM

## 2024-10-02 DIAGNOSIS — N39.3 STRESS INCONTINENCE (FEMALE) (MALE): ICD-10-CM

## 2024-10-03 LAB
PATH REPORT.COMMENTS IMP SPEC: NORMAL
PATH REPORT.COMMENTS IMP SPEC: NORMAL
PATH REPORT.FINAL DX SPEC: NORMAL
PATH REPORT.GROSS SPEC: NORMAL
PATH REPORT.MICROSCOPIC SPEC OTHER STN: NORMAL
PATH REPORT.RELEVANT HX SPEC: NORMAL
PHOTO IMAGE: NORMAL

## 2024-11-13 NOTE — NURSING NOTE
"Chief Complaint   Patient presents with     Breast Problem     Right side lump        Initial /68 (BP Location: Right arm, Patient Position: Sitting, Cuff Size: Adult Regular)   Wt 66.2 kg (146 lb)   LMP 10/01/2022   BMI 23.57 kg/m   Estimated body mass index is 23.57 kg/m  as calculated from the following:    Height as of 22: 1.676 m (5' 6\").    Weight as of this encounter: 66.2 kg (146 lb).  BP completed using cuff size: regular    Questioned patient about current smoking habits.  Pt. has never smoked.          The following HM Due: NONE    Chalo Childress CMA                " no

## 2025-05-07 ENCOUNTER — OFFICE VISIT (OUTPATIENT)
Dept: DERMATOLOGY | Facility: CLINIC | Age: 40
End: 2025-05-07
Payer: COMMERCIAL

## 2025-05-07 DIAGNOSIS — D22.9 MULTIPLE BENIGN NEVI: Primary | ICD-10-CM

## 2025-05-07 DIAGNOSIS — L81.4 LENTIGINES: ICD-10-CM

## 2025-05-07 DIAGNOSIS — Z86.006 HISTORY OF MELANOMA IN SITU: ICD-10-CM

## 2025-05-07 PROCEDURE — 99213 OFFICE O/P EST LOW 20 MIN: CPT | Performed by: STUDENT IN AN ORGANIZED HEALTH CARE EDUCATION/TRAINING PROGRAM

## 2025-05-07 PROCEDURE — 1126F AMNT PAIN NOTED NONE PRSNT: CPT | Performed by: STUDENT IN AN ORGANIZED HEALTH CARE EDUCATION/TRAINING PROGRAM

## 2025-05-07 RX ORDER — ROSUVASTATIN CALCIUM 20 MG/1
20 TABLET, COATED ORAL DAILY
COMMUNITY

## 2025-05-07 RX ORDER — TIRZEPATIDE 5 MG/.5ML
INJECTION, SOLUTION SUBCUTANEOUS
COMMUNITY
Start: 2024-07-01

## 2025-05-07 ASSESSMENT — PAIN SCALES - GENERAL: PAINLEVEL_OUTOF10: NO PAIN (0)

## 2025-05-07 NOTE — PROGRESS NOTES
[facility] Dermatology Note    Encounter Date: May 7, 2025    Dermatology Problem List:  #Hx Atypical JMP L calf 04/14/23     Major PMHx  -   ______________________________________    Impression/Plan:  Sarahi was seen today for skin check.    Diagnoses and all orders for this visit:    Multiple benign nevi  Lentigines  - Reviewed the compounding benefits of incremental changes to sun protective behaviors including increased frequency of sunscreen and sun protective clothing like broad brimmed hats and longsleeved UPF containing clothing    History of melanoma in situ  - L calf  - No obvious evidence of recurrence at site of previous malignancy        Follow-up in 1.5 yrs.       Staff Involved:  Staff Only    Jose Roberto Wolfe MD   of Dermatology  Department of Dermatology  [facility]      CC:   Chief Complaint   Patient presents with    Skin Check       History of Present Illness:  Ms. Sarahi Gates is a 40 year old female who presents as a return patient.    Pt presents today for concerns about spots on trunk and extremities       Labs:      Physical exam:  Vitals: LMP 07/10/2023   Breastfeeding No   GEN: well developed, well-nourished, in no acute distress, in a pleasant mood.     SKIN: Mercedes phototype 1  - Full skin, which includes the head/face, both arms, chest, back, abdomen,both legs, genitalia and/or groin buttocks, digits and/or nails, was examined.  - No obvious evidence of recurrence at site of previous malignancy  - Flat brown macules and patches in a sun exposed areas on face and extremities  - scattered brown papules on trunk and extremities   - No obvious evidence of recurrence at site of previous malignancy  - No other lesions of concern on areas examined.     Past Medical History:   Past Medical History:   Diagnosis Date    Abnormal Pap smear     most recent normal    Asthma 05/02/2011    Problem list name updated by  automated process. Provider to review    Environmental allergies     using zyrtec    History of gestational diabetes     Malignant melanoma (H)     Mental disorder     on meds    Moderate persistent asthma 07/06/2011     Past Surgical History:   Procedure Laterality Date    ABDOMINOPLASTY  05/2022    DENTAL SURGERY  01/01/2003    wisdom teeth    [place] FACE LIFT & NECK  11/2022    Neck lift       Social History:   reports that she has never smoked. She has never used smokeless tobacco. She reports current alcohol use of about 5.0 standard drinks of alcohol per week. She reports that she does not use drugs.    Family History:  Family History   Problem Relation Age of Onset    Thyroid Disease Mother         both hypo and hyper?    Hypertension Mother         PIH    Allergies Father     Allergies Brother     Skin Cancer Maternal Grandfather     Allergies Paternal Grandmother     Asthma Paternal Grandfather     Allergies Paternal Grandfather     Skin Cancer Paternal Grandfather     Breast Cancer Other         maternal great-aunt       Medications:  Current Outpatient Medications   Medication Sig Dispense Refill    cetirizine (ZYRTEC) 10 MG tablet Take 1 tablet by mouth every evening. 30 tablet 1    Cholecalciferol (VITAMIN D3 PO) Take by mouth daily      rosuvastatin (CRESTOR) 20 MG tablet Take 20 mg by mouth daily.      sertraline (ZOLOFT) 100 MG tablet TAKE 1 TABLET(100 MG) BY MOUTH DAILY 90 tablet 3    ZEPBOUND 5 MG/0.5ML prefilled pen INJECT 5 MG SUBCUTANEOUSLY ONCE A WEEK FOR 28 DAYS      buPROPion (WELLBUTRIN XL) 150 MG 24 hr tablet Take 1 tablet (150 mg) by mouth every morning (Patient not taking: Reported on 5/7/2025) 90 tablet 3    etonogestrel-ethinyl estradiol (NUVARING) 0.12-0.015 MG/24HR vaginal ring Place 1 each vaginally every 28 days (Patient not taking: Reported on 5/7/2025) 3 each 3    FLUoxetine (PROZAC) 10 MG capsule Take one daily starting on day 15 of the cycle through the first day of period. (Patient  not taking: Reported on 5/7/2025) 30 capsule 6     No Known Allergies

## 2025-05-07 NOTE — LETTER
5/7/2025      Sarahi Gates  [address]  [place] [address]      Dear Colleague,    Thank you for referring your patient, Sarahi Gates, to the [facility]. Please see a copy of my visit note below.    [facility] Dermatology Note    Encounter Date: May 7, 2025    Dermatology Problem List:  #Hx Atypical JMP L calf 04/14/23     Major PMHx  -   ______________________________________    Impression/Plan:  Sarahi was seen today for skin check.    Diagnoses and all orders for this visit:    Multiple benign nevi  Lentigines  - Reviewed the compounding benefits of incremental changes to sun protective behaviors including increased frequency of sunscreen and sun protective clothing like broad brimmed hats and longsleeved UPF containing clothing    History of melanoma in situ  - L calf  - No obvious evidence of recurrence at site of previous malignancy        Follow-up in 1.5 yrs.       Staff Involved:  Staff Only    Jose Roberto Wolfe MD   of Dermatology  Department of Dermatology  [facility] [employer] [facility]      CC:   Chief Complaint   Patient presents with     Skin Check       History of Present Illness:  Ms. Sarahi Gates is a 40 year old female who presents as a return patient.    Pt presents today for concerns about spots on trunk and extremities       Labs:      Physical exam:  Vitals: LMP 07/10/2023   Breastfeeding No   GEN: well developed, well-nourished, in no acute distress, in a pleasant mood.     SKIN: Mercedes phototype 1  - Full skin, which includes the head/face, both arms, chest, back, abdomen,both legs, genitalia and/or groin buttocks, digits and/or nails, was examined.  - No obvious evidence of recurrence at site of previous malignancy  - Flat brown macules and patches in a sun exposed areas on face and extremities  - scattered brown papules on trunk and extremities   - No obvious evidence of recurrence at  site of previous malignancy  - No other lesions of concern on areas examined.     Past Medical History:   Past Medical History:   Diagnosis Date     Abnormal Pap smear     most recent normal     Asthma 05/02/2011    Problem list name updated by automated process. Provider to review     Environmental allergies     using zyrtec     History of gestational diabetes      Malignant melanoma (H)      Mental disorder     on meds     Moderate persistent asthma 07/06/2011     Past Surgical History:   Procedure Laterality Date     ABDOMINOPLASTY  05/2022     DENTAL SURGERY  01/01/2003    wisdom teeth     [place] FACE LIFT & NECK  11/2022    Neck lift       Social History:   reports that she has never smoked. She has never used smokeless tobacco. She reports current alcohol use of about 5.0 standard drinks of alcohol per week. She reports that she does not use drugs.    Family History:  Family History   Problem Relation Age of Onset     Thyroid Disease Mother         both hypo and hyper?     Hypertension Mother         PIH     Allergies Father      Allergies Brother      Skin Cancer Maternal Grandfather      Allergies Paternal Grandmother      Asthma Paternal Grandfather      Allergies Paternal Grandfather      Skin Cancer Paternal Grandfather      Breast Cancer Other         maternal great-aunt       Medications:  Current Outpatient Medications   Medication Sig Dispense Refill     cetirizine (ZYRTEC) 10 MG tablet Take 1 tablet by mouth every evening. 30 tablet 1     Cholecalciferol (VITAMIN D3 PO) Take by mouth daily       rosuvastatin (CRESTOR) 20 MG tablet Take 20 mg by mouth daily.       sertraline (ZOLOFT) 100 MG tablet TAKE 1 TABLET(100 MG) BY MOUTH DAILY 90 tablet 3     ZEPBOUND 5 MG/0.5ML prefilled pen INJECT 5 MG SUBCUTANEOUSLY ONCE A WEEK FOR 28 DAYS       buPROPion (WELLBUTRIN XL) 150 MG 24 hr tablet Take 1 tablet (150 mg) by mouth every morning (Patient not taking: Reported on 5/7/2025) 90 tablet 3      etonogestrel-ethinyl estradiol (NUVARING) 0.12-0.015 MG/24HR vaginal ring Place 1 each vaginally every 28 days (Patient not taking: Reported on 5/7/2025) 3 each 3     FLUoxetine (PROZAC) 10 MG capsule Take one daily starting on day 15 of the cycle through the first day of period. (Patient not taking: Reported on 5/7/2025) 30 capsule 6     No Known Allergies              Again, thank you for allowing me to participate in the care of your patient.        Sincerely,        Jose Roberto Wolfe MD    Electronically signed

## 2025-06-01 ENCOUNTER — HEALTH MAINTENANCE LETTER (OUTPATIENT)
Age: 40
End: 2025-06-01